# Patient Record
Sex: FEMALE | Race: WHITE | NOT HISPANIC OR LATINO | Employment: FULL TIME | URBAN - METROPOLITAN AREA
[De-identification: names, ages, dates, MRNs, and addresses within clinical notes are randomized per-mention and may not be internally consistent; named-entity substitution may affect disease eponyms.]

---

## 2017-08-16 ENCOUNTER — OFFICE VISIT (OUTPATIENT)
Dept: MEDICAL GROUP | Facility: PHYSICIAN GROUP | Age: 22
End: 2017-08-16
Payer: COMMERCIAL

## 2017-08-16 VITALS
BODY MASS INDEX: 20.72 KG/M2 | HEIGHT: 67 IN | TEMPERATURE: 98.8 F | WEIGHT: 131.99 LBS | DIASTOLIC BLOOD PRESSURE: 60 MMHG | SYSTOLIC BLOOD PRESSURE: 100 MMHG | OXYGEN SATURATION: 98 % | HEART RATE: 76 BPM

## 2017-08-16 DIAGNOSIS — B07.9 VIRAL WARTS, UNSPECIFIED TYPE: ICD-10-CM

## 2017-08-16 DIAGNOSIS — K59.00 CONSTIPATION, UNSPECIFIED CONSTIPATION TYPE: ICD-10-CM

## 2017-08-16 DIAGNOSIS — G43.009 MIGRAINE WITHOUT AURA AND WITHOUT STATUS MIGRAINOSUS, NOT INTRACTABLE: ICD-10-CM

## 2017-08-16 PROBLEM — Z76.89 ENCOUNTER TO ESTABLISH CARE: Status: RESOLVED | Noted: 2017-08-16 | Resolved: 2017-08-16

## 2017-08-16 PROBLEM — Z76.89 ENCOUNTER TO ESTABLISH CARE: Status: ACTIVE | Noted: 2017-08-16

## 2017-08-16 PROCEDURE — 17110 DESTRUCTION B9 LES UP TO 14: CPT | Performed by: PHYSICIAN ASSISTANT

## 2017-08-16 PROCEDURE — 99204 OFFICE O/P NEW MOD 45 MIN: CPT | Mod: 25 | Performed by: PHYSICIAN ASSISTANT

## 2017-08-16 RX ORDER — CYCLOBENZAPRINE HCL 5 MG
5-10 TABLET ORAL
Qty: 30 TAB | Refills: 0 | Status: SHIPPED | OUTPATIENT
Start: 2017-08-16 | End: 2020-05-26

## 2017-08-16 ASSESSMENT — PATIENT HEALTH QUESTIONNAIRE - PHQ9: CLINICAL INTERPRETATION OF PHQ2 SCORE: 0

## 2017-08-16 NOTE — MR AVS SNAPSHOT
"        Mariia Kwonjose alberto   2017 2:00 PM   Office Visit   MRN: [id number]    Department:  [facility]   Dept Phone:  [phone]    Description:  Female : 1995   Provider:  Debbi Bernal PA-C           Reason for Visit     Warts removal, right hand      Allergies as of 2017     No Known Allergies      You were diagnosed with     Encounter to establish care   [089390]       Migraine without aura and without status migrainosus, not intractable   [[id number]]       Constipation, unspecified constipation type   [2739123]       Viral warts, unspecified type   [9107629]         Vital Signs     Blood Pressure Pulse Temperature Height Weight Body Mass Index    100/60 mmHg 76 37.1 °C (98.8 °F) 1.702 m (5' 7.01\") 59.87 kg (131 lb 15.8 oz) 20.67 kg/m2    Oxygen Saturation Smoking Status                98% Never Smoker           Basic Information     Date Of Birth Sex Race Ethnicity Preferred Language    1995 Female White Non- English      Problem List              ICD-10-CM Priority Class Noted - Resolved    Encounter to establish care Z76.89   2017 - Present    Migraine without aura and without status migrainosus, not intractable G43.009   2017 - Present    Constipation K59.00   2017 - Present    Wart viral B07.9   2017 - Present      Health Maintenance        Date Due Completion Dates    IMM HPV VACCINE (3 of 3 - Female 3 Dose Series) 2014, 7/3/2013    PAP SMEAR 2015 ---    IMM INFLUENZA (1) 2017 10/26/2016    IMM DTaP/Tdap/Td Vaccine (7 - Td) 2019, 2000, 1996, 1995, 1995, 2/15/1995            Current Immunizations     DTP/HIB Combined Vaccine 1996, 1995, 1995, 2/15/1995    Dtap Vaccine 2000    HPV Quadrivalent Vaccine (GARDASIL) 2013, 7/3/2013    Hepatitis A Vaccine, Ped/Adol 7/3/2013, 2000    Hepatitis B Vaccine Non-Recombivax (Ped/Adol) 1995, 2/15/1995, 1994    IPV 2000, 1995, " 4/18/1995, 2/15/1995    Influenza TIV (IM) 10/26/2016    MMR Vaccine 6/8/2000, 1/24/1996    Meningococcal Conjugate Vaccine MCV4 (Menactra) 7/3/2013    Tdap Vaccine 8/5/2009    Varicella Vaccine Live 8/5/2009, 10/30/1998      Below and/or attached are the medications your provider expects you to take. Review all of your home medications and newly ordered medications with your provider and/or pharmacist. Follow medication instructions as directed by your provider and/or pharmacist. Please keep your medication list with you and share with your provider. Update the information when medications are discontinued, doses are changed, or new medications (including over-the-counter products) are added; and carry medication information at all times in the event of emergency situations     Allergies:  No Known Allergies          Medications  Valid as of: August 16, 2017 -  2:27 PM    Generic Name Brand Name Tablet Size Instructions for use    Cyclobenzaprine HCl (Tab) FLEXERIL 5 MG Take 1-2 Tabs by mouth 1 time daily as needed.        Levonorgestrel   by Intrauterine route.        .                 Medicines prescribed today were sent to:     [facility] [address] [place] [id number]    Phone: [phone] Fax: [phone]    Open 24 Hours?: No      Medication refill instructions:       If your prescription bottle indicates you have medication refills left, it is not necessary to call your provider’s office. Please contact your pharmacy and they will refill your medication.    If your prescription bottle indicates you do not have any refills left, you may request refills at any time through one of the following ways: The online Featurespace system (except Urgent Care), by calling your provider’s office, or by asking your pharmacy to contact your provider’s office with a refill request. Medication refills are processed only during regular business hours and may not be  available until the next business day. Your provider may request additional information or to have a follow-up visit with you prior to refilling your medication.   *Please Note: Medication refills are assigned a new Rx number when refilled electronically. Your pharmacy may indicate that no refills were authorized even though a new prescription for the same medication is available at the pharmacy. Please request the medicine by name with the pharmacy before contacting your provider for a refill.           ZeusControls Access Code: [email or web address]  Expires: 9/14/2017 12:20 PM    ZeusControls  A secure, online tool to manage your health information     [employer]’s ZeusControls® is a secure, online tool that connects you to your personalized health information from the privacy of your home -- day or night - making it very easy for you to manage your healthcare. Once the activation process is completed, you can even access your medical information using the ZeusControls toby, which is available for free in the Apple Toby store or Google Play store.     ZeusControls provides the following levels of access (as shown below):   My Chart Features   [facility] Doctor [facility]  Specialists [facility]  Urgent  Care Non-[facility]  Primary Care  Doctor   Email your healthcare team securely and privately 24/7 X X X    Manage appointments: schedule your next appointment; view details of past/upcoming appointments X      Request prescription refills. X      View recent personal medical records, including lab and immunizations X X X X   View health record, including health history, allergies, medications X X X X   Read reports about your outpatient visits, procedures, consult and ER notes X X X X   See your discharge summary, which is a recap of your hospital and/or ER visit that includes your diagnosis, lab results, and care plan. X X       How to register for ZeusControls:  1. Go to  [email or web address].  2. Click on the Sign Up Now box, which takes  you to the New Member Sign Up page. You will need to provide the following information:  a. Enter your [employer] Access Code exactly as it appears at the top of this page. (You will not need to use this code after you’ve completed the sign-up process. If you do not sign up before the expiration date, you must request a new code.)   b. Enter your date of birth.   c. Enter your home email address.   d. Click Submit, and follow the next screen’s instructions.  3. Create a [employer] ID. This will be your [employer] login ID and cannot be changed, so think of one that is secure and easy to remember.  4. Create a [employer] password. You can change your password at any time.  5. Enter your Password Reset Question and Answer. This can be used at a later time if you forget your password.   6. Enter your e-mail address. This allows you to receive e-mail notifications when new information is available in [employer].  7. Click Sign Up. You can now view your health information.    For assistance activating your [employer] account, call [phone]

## 2017-08-16 NOTE — Clinical Note
[facility]  Debbi Bernal PA-C  [address]  [place] [address]  Fax: [phone]   Authorization for Release/Disclosure of   Protected Health Information   Name: GIDEON ALEXANDRA : 1995 SSN: [id number]   Address: [place] [address] Phone:    [phone] (home)    I authorize the entity listed below to release/disclose the PHI below to:   [facility]/Debbi Bernal PA-C and Debbi Bernal PA-C   Provider or Entity Name:     Address   City, State, Zip   Phone:      Fax:     Reason for request: continuity of care   Information to be released:    [  ] LAST COLONOSCOPY,  including any PATH REPORT and follow-up  [  ] LAST FIT/COLOGUARD RESULT [  ] LAST DEXA  [  ] LAST MAMMOGRAM  [  ] LAST PAP  [  ] LAST LABS [  ] RETINA EXAM REPORT  [  ] IMMUNIZATION RECORDS  [ x] Release all info      [  ] Check here and initial the line next to each item to release ALL health information INCLUDING  _____ Care and treatment for drug and / or alcohol abuse  _____ HIV testing, infection status, or AIDS  _____ Genetic Testing    DATES OF SERVICE OR TIME PERIOD TO BE DISCLOSED: _____________  I understand and acknowledge that:  * This Authorization may be revoked at any time by you in writing, except if your health information has already been used or disclosed.  * Your health information that will be used or disclosed as a result of you signing this authorization could be re-disclosed by the recipient. If this occurs, your re-disclosed health information may no longer be protected by State or Federal laws.  * You may refuse to sign this Authorization. Your refusal will not affect your ability to obtain treatment.  * This Authorization becomes effective upon signing and will  on (date) __________.      If no date is indicated, this Authorization will  one (1) year from the signature date.    Name: Gideon Alexandra    Signature:   Date:     2017       PLEASE FAX REQUESTED RECORDS BACK TO: [phone]  [phone]

## 2017-08-16 NOTE — PROGRESS NOTES
Chief Complaint   Patient presents with   • Warts     removal, right hand       HISTORY OF THE PRESENT ILLNESS: Mariia Wills is a 21 y.o. female new patient to our practice. This pleasant patient is here today to establish care and to discuss the evaluation and management of:      Constipation  Pt. States she has been experiencing constipation since high school.  States her diet is healthy and consist of fruits, vegetables, chicken, and salads. No mention of improvement on fiber intake. States once a week she'll experience constipation for 1-2 days. States during those times she has tried Miralax but inconsistently and prune juice. Denies nausea, vomiting, abdominal pain, melena, hematochezia, fever, chills, weight loss.    Verrucae vulgaris  Patient states she developed a wart on her right lateral posterior hand getting her first week in [place] 2 months ago. States during hot weather she will fill a throbbing sensation where the wart is located. States she first developed a small, raised, red bump that eventually developed into a wart. Denies trauma/bleeding/inflammation/discharge, fever, chills, nausea, vomiting.    Migraine without aura and without status migrainosus, not intractable  Pt.states she developed migraines when she was a karlos in high school. States she was treated by a primary care provider at [facility] and has tried several prescribed medications with no success ( unable to recall medication). States in the past she has tried eliminating certain foods and trying various birth control methods. States she was placed on oral contraceptive (unable to recall medication name)  when she was a sophomore in high school to treat acne. States freshman year in college and she was placed on NuvaRing in hopes to improve migraine headaches with no improvement. States she was changed to a different type of oral contraceptive (unable to recall medication name) during her sophomore year in college with no  alleviation of migraines. States birth control was discontinued for 2-3 months prior to getting a Mirena IUD 1-1/2 years ago in hopes migraines would dissipate. States she was still experiencing migraines without contraceptive. States with the Mirena IUD she was experiencing 2-3 migraines per month until she started getting monthly massages. States since getting a monthly massages she experiences one migraine per month. Patient states when she develops a migraine she experiences one episode of vomiting lasting a few minutes in duration, is nauseated feeling, not sensitive for light or sounds. States migraine headache is localized to left portion of her head and neck and last 24-48 hours in duration. Patient believes she is experiencing tension-induced migraines. States during migraine episode mother will massage her neck/upper back and she will use essential oils. States she receives some alleviation when doing so. Denies vision changes, weight loss, fever, chills, syncope, dizziness, tinnitus, aura, excessive thirst, cold intolerance, heat intolerance, fatigue.        No past medical history on file.    No past surgical history on file.    Family Status   Relation Status Death Age   • Mother Alive    • Father Alive    • Sister Alive    • Paternal Grandfather Alive    • Maternal Grandmother     • Maternal Grandfather     • Paternal Grandmother       Family History   Problem Relation Age of Onset   • No Known Problems Mother    • No Known Problems Father    • Other Sister      migraine headaches   • Cancer Paternal Grandmother      Breast cancer   • Cancer Maternal Grandmother      Colon cancer   • Heart Disease Paternal Grandfather    • Lung Disease Maternal Grandfather        Social History   Substance Use Topics   • Smoking status: Never Smoker    • Smokeless tobacco: None      Comment: 5 cigarettes entire life   • Alcohol Use: None      Comment: once every two weeks (4-5 glasses of wine  "per time)       Allergies: Review of patient's allergies indicates no known allergies.    Current Outpatient Prescriptions [facility]   Medication Sig Dispense Refill   • Levonorgestrel (MIRENA, 52 MG, IU) by Intrauterine route.     • cyclobenzaprine (FLEXERIL) 5 MG tablet Take 1-2 Tabs by mouth 1 time daily as needed. 30 Tab 0     No current Epic-ordered facility-administered medications on file.       Review of Systems   Constitutional: Negative for fever, chills, weight loss and malaise/fatigue.   HENT: Negative for ear pain, nosebleeds, congestion, sore throat and neck pain.    Eyes: Negative for blurred vision.   Respiratory: Negative for cough, sputum production, shortness of breath and wheezing.    Cardiovascular: Negative for chest pain, palpitations, orthopnea and leg swelling.   Gastrointestinal: Negative for heartburn, nausea, vomiting and abdominal pain. Positive for constipation.  Genitourinary: Negative for dysuria, urgency and frequency.   Musculoskeletal: Negative for myalgias, back pain and joint pain.   Skin: Negative for rash and itching.   Neurological: Negative for dizziness, tingling, tremors, sensory change, focal weakness and positive for headaches.   Endo/Heme/Allergies: Does not bruise/bleed easily.   Psychiatric/Behavioral: Negative for depression, anxiety, or memory loss.     All other systems reviewed and are negative except as in HPI.    Exam: Blood pressure 100/60, pulse 76, temperature 37.1 °C (98.8 °F), height 1.702 m (5' 7.01\"), weight 59.87 kg (131 lb 15.8 oz), SpO2 98 %. Body mass index is 20.67 kg/(m^2).  General: Normal appearing. No distress.  HEENT: Normocephalic. Eyes conjunctiva clear lids without ptosis, pupils equal and reactive to light accommodation, ears normal shape and contour, canals are clear bilaterally, tympanic membranes are benign, nasal mucosa benign, oropharynx is without erythema, edema or exudates.   Neck: Supple without JVD or bruit. Thyroid is not " enlarged.  Pulmonary: Clear to ausculation.  Normal effort. No rales, ronchi, or wheezing.  Cardiovascular: Regular rate and rhythm without murmur. Carotid and radial pulses are intact and equal bilaterally.  Abdomen: Soft, nontender, nondistended. Normal bowel sounds. Liver and spleen are not palpable  Neurologic: Grossly nonfocal  Lymph: No cervical, supraclavicular or axillary lymph nodes are palpable  Skin: Warm and dry.  No obvious lesions. Positive for a pea sized verrucae vulgaris on right medial posterior portion of hand. Negative for erythema/inflammation/discharge/bleeding/trauma.  Musculoskeletal: Normal gait. No extremity cyanosis, clubbing, or edema.  Psych: Normal mood and affect. Alert and oriented x3. Judgment and insight is normal.      Medical decision-making and discussion: She is a 21-year-old female who is here today to establish care and discuss report on right medial posterior portion of hand that developed 2 months ago, constipation, and migraines. Patient states in experiencing constipation since high school and is aware that she needs to improve on fiber intake. During today's office visit advised patient to increase fiber intake, stay hydrated, suggested over-the-counter MiraLAX and probiotics. Disscused with patient MiraLAX needs to be used consistently used for best results and up to 4 cupfuls can be used per day. Advised patient she can adjust frequency and amount of MirLax she uses according to frequency in BMs. Patient states she has suffered from migraines since high school and believes they are tension related. During today's visit patient was prescribed to flexeril 5 mg tablet as needed. Discussed with patient the adverse reactions and side effects of the medication. Discussed with patient to not operate any machinery when taking the medication advised patient to begin taking the medication at night due to sedative effects. Discussed with patient that if she feels that 5 mg is too  potent for her than she can try taking 2.5 mg instead. Advised patient if she develops side effects and complications of medication to make an appointment for further evaluation and discuss other treatment options. During today's visit a common wart was removed from the right medial posterior portion of the patient's hands with cryotherapy. Patient will follow up in one month for reevaluation of common wart and med eval.  CRYOTHERAPY:  Discussed risks and benefits of cryotherapy. Patient verbally agreed. Three applications of liquid nitrogen were applied to 1 lesion on right medial posterior portion of hand. Patient tolerated procedure well. Aftercare and return precaution instructions given.    Please note that this dictation was created using voice recognition software. I have made every reasonable attempt to correct obvious errors, but I expect that there are errors of grammar and possibly content that I did not discover before finalizing the note.      Assessment/Plan  1. Migraine without aura and without status migrainosus, not intractable  cyclobenzaprine (FLEXERIL) 5 MG tablet   2. Constipation, unspecified constipation type     3. Viral warts, unspecified type         Return in about 1 month (around 9/16/2017).

## 2017-08-16 NOTE — ASSESSMENT & PLAN NOTE
Pt.states she developed migraines when she was a karlos in high school. States she was treated by a primary care provider at [facility] and has tried several prescribed medications with no success ( unable to recall medication). States in the past she has tried eliminating certain foods and trying various birth control methods. States she was placed on oral contraceptive (unable to recall medication name)  when she was a sophomore in high school to treat acne. States freshman year in college and she was placed on NuvaRing in hopes to improve migraine headaches with no improvement. States she was changed to a different type of oral contraceptive (unable to recall medication name) during her sophomore year in college with no alleviation of migraines. States birth control was discontinued for 2-3 months prior to getting a Mirena IUD 1-1/2 years ago in hopes migraines would dissipate. States she was still experiencing migraines without contraceptive. States with the Mirena IUD she was experiencing 2-3 migraines per month until she started getting monthly massages. States since getting a monthly massages she experiences one migraine per month. Patient states when she develops a migraine she experiences one episode of vomiting lasting a few minutes in duration, is nauseated feeling, not sensitive for light or sounds. States migraine headache is localized to left portion of her head and neck and last 24-48 hours in duration. Patient believes she is experiencing tension-induced migraines. States during migraine episode mother will massage her neck/upper back and she will use essential oils. States she receives some alleviation when doing so. Denies vision changes, weight loss, fever, chills, syncope, dizziness, tinnitus, aura, excessive thirst, cold intolerance, heat intolerance, fatigue.

## 2017-08-16 NOTE — ASSESSMENT & PLAN NOTE
Patient states she developed a wart on her right medial posterior hand getting her first week in [place] 2 months ago. States during hot weather she will fill a throbbing sensation where the wart is located. States she first developed a small, raised, red bump that eventually developed into a wart. Denies trauma/bleeding/inflammation/discharge, fever, chills, nausea, vomiting.

## 2017-08-16 NOTE — ASSESSMENT & PLAN NOTE
Pt. States she has been experiencing constipation since high school.  States her diet is healthy and consist of fruits, vegetables, chicken, and salads. No mention of improvement on fiber intake. States once a week she'll experience constipation for 1-2 days. States during those times she has tried Miralax but inconsistently and prune juice. Denies nausea, vomiting, abdominal pain, melena, hematochezia, fever, chills, weight loss.

## 2017-10-06 ENCOUNTER — OFFICE VISIT (OUTPATIENT)
Dept: MEDICAL GROUP | Facility: PHYSICIAN GROUP | Age: 22
End: 2017-10-06
Payer: COMMERCIAL

## 2017-10-06 VITALS
OXYGEN SATURATION: 100 % | BODY MASS INDEX: 20.72 KG/M2 | HEART RATE: 72 BPM | WEIGHT: 132 LBS | HEIGHT: 67 IN | TEMPERATURE: 97.2 F | RESPIRATION RATE: 16 BRPM | SYSTOLIC BLOOD PRESSURE: 102 MMHG | DIASTOLIC BLOOD PRESSURE: 68 MMHG

## 2017-10-06 DIAGNOSIS — G43.009 MIGRAINE WITHOUT AURA AND WITHOUT STATUS MIGRAINOSUS, NOT INTRACTABLE: ICD-10-CM

## 2017-10-06 DIAGNOSIS — B07.9 VIRAL WARTS, UNSPECIFIED TYPE: ICD-10-CM

## 2017-10-06 PROCEDURE — 99214 OFFICE O/P EST MOD 30 MIN: CPT | Mod: 29 | Performed by: PHYSICIAN ASSISTANT

## 2017-10-06 ASSESSMENT — PAIN SCALES - GENERAL: PAINLEVEL: NO PAIN

## 2017-10-06 NOTE — ASSESSMENT & PLAN NOTE
Patient states since last office visit on 08/16/17 post cryotherapy wart on her right medial MCP joint of thumb has drastically improved. Denies complications from cryotherapy. States she used duck tape on area nightly for one week but discontinued due to causing area to smell. States she used Compound W bid for one week post duct tape.  Denies trauma/bleeding/inflammation/discharge.

## 2017-10-06 NOTE — ASSESSMENT & PLAN NOTE
Pt. States since last office visit on 08/16/17 she has experienced 3 separate migraines. Patient was prescribed Flexeril 5 mg tablet to take when experiencing a migraine during last office visit. Patient states she's unsure if the medication makes her feel nauseated or if it's due to the migraines. Admits during last migraine episode she did not feel nauseated with medication. States she would like to continue Flexeril for treatment of her migraines. States migraine duration has not improved but intensity of migraine has improved since being on prescribed medication. States in the past she has tried several other treatment options with no success. Is unable to recall medications used in the past. States she is still experiencing one episode of vomiting with migraine headaches. Denies sensitivity to light or sounds.  Denies vision changes, weight loss, fever, chills, syncope, dizziness, tinnitus, aura, excessive thirst, cold intolerance, heat intolerance, fatigue, chest pain, shortness of breath.

## 2017-10-07 NOTE — PROGRESS NOTES
Chief Complaint   Patient presents with   • Follow-Up     Wart check, migranes       HISTORY OF PRESENT ILLNESS: Mariia Wills is an established 21 y.o. female here to discuss the evaluation and management of:    Migraine without aura and without status migrainosus, not intractable  Pt. States since last office visit on 08/16/17 she has experienced 3 separate migraines. Patient was prescribed Flexeril 5 mg tablet to take when experiencing a migraine during last office visit. Patient states she's unsure if the medication makes her feel nauseated or if it's due to the migraines. Admits during last migraine episode she did not feel nauseated with medication. States she would like to continue Flexeril for treatment of her migraines. States migraine duration has not improved but intensity of migraine has improved since being on prescribed medication. States in the past she has tried several other treatment options with no success. Is unable to recall medications used in the past. States she is still experiencing one episode of vomiting with migraine headaches. Denies sensitivity to light or sounds.  Denies vision changes, weight loss, fever, chills, syncope, dizziness, tinnitus, aura, excessive thirst, cold intolerance, heat intolerance, fatigue, chest pain, shortness of breath.    Verrucae vulgaris  Patient states since last office visit on 08/16/17 post cryotherapy wart on her right medial MCP joint of thumb has drastically improved. Denies complications from cryotherapy. States she used duck tape on area nightly for one week but discontinued due to causing area to smell. States she used Compound W bid for one week post duct tape.  Denies trauma/bleeding/inflammation/discharge.         Patient Active Problem List    Diagnosis Date Noted   • Migraine without aura and without status migrainosus, not intractable 08/16/2017   • Constipation 08/16/2017   • Verrucae vulgaris 08/16/2017       Allergies:Review of patient's  allergies indicates no known allergies.    Current Outpatient Prescriptions   Medication Sig Dispense Refill   • Levonorgestrel (MIRENA, 52 MG, IU) by Intrauterine route.     • cyclobenzaprine (FLEXERIL) 5 MG tablet Take 1-2 Tabs by mouth 1 time daily as needed. 30 Tab 0     No current facility-administered medications for this visit.        Social History   Substance Use Topics   • Smoking status: Never Smoker   • Smokeless tobacco: Never Used      Comment: 5 cigarettes entire life   • Alcohol use No      Comment: once every two weeks (4-5 glasses of wine per time)       Family Status   Relation Status   • Mother Alive   • Father Alive   • Sister Alive   • Paternal Grandfather Alive   • Maternal Grandmother    • Maternal Grandfather    • Paternal Grandmother    • Sister      Family History   Problem Relation Age of Onset   • No Known Problems Mother    • No Known Problems Father    • Heart Disease Paternal Grandfather    • Cancer Maternal Grandmother      Colon cancer   • Lung Disease Maternal Grandfather    • Cancer Paternal Grandmother      Breast cancer   • Other Sister      migraine headaches       ROS:  Review of Systems   Constitutional: Negative for fever, chills, weight loss and malaise/fatigue.   HENT: Negative for ear pain, nosebleeds, congestion, sore throat and neck pain.    Eyes: Negative for blurred vision.   Respiratory: Negative for cough, sputum production, shortness of breath and wheezing.    Cardiovascular: Negative for chest pain, palpitations, orthopnea and leg swelling.   Gastrointestinal: Negative for heartburn, nausea, vomiting and abdominal pain.   Genitourinary: Negative for dysuria, urgency and frequency.   Musculoskeletal: Negative for myalgias, back pain and joint pain.   Skin: Negative for rash and itching. Positive for wart on right hand.   Neurological: Negative for dizziness, tingling, tremors, sensory change, focal weakness and positive for headaches.  "  Endo/Heme/Allergies: Does not bruise/bleed easily.   Psychiatric/Behavioral: Negative for depression, suicidal ideas and memory loss.  The patient is not nervous/anxious and does not have insomnia.    All other systems reviewed and are negative except as in HPI.    Exam: Blood pressure 102/68, pulse 72, temperature 36.2 °C (97.2 °F), resp. rate 16, height 1.702 m (5' 7\"), weight 59.9 kg (132 lb), SpO2 100 %, not currently breastfeeding. Body mass index is 20.67 kg/m².  General: Normal appearing. No distress.  HEENT: Normocephalic. Eyes conjunctiva clear lids without ptosis, pupils equal and reactive to light accommodation, ears normal shape and contour, canals are clear bilaterally, tympanic membranes are benign, nasal mucosa benign, oropharynx is without erythema, edema or exudates.   Neck: Supple without JVD or bruit. Thyroid is not enlarged.  Pulmonary: Clear to ausculation.  Normal effort. No rales, ronchi, or wheezing.  Cardiovascular: Regular rate and rhythm without murmur. Carotid and radial pulses are intact and equal bilaterally.  Abdomen: Soft, nontender, nondistended. Normal bowel sounds. Liver and spleen are not palpable  Neurologic: Grossly nonfocal.  Cranial nerves are normal. DTR's normal and symmetric.  Lymph: No cervical, supraclavicular or axillary lymph nodes are palpable  Skin: Warm and dry.  No rashes or suspicious skin lesions. Small slightly raised circular fleshed colored verrucae vulgaris on right medial MCP thumb joint.   Musculoskeletal: Normal gait. No extremity cyanosis, clubbing, or edema.  Psych: Normal mood and affect. Alert and oriented x3. Judgment and insight is normal.    Medical decision-making and discussion:  1. Migraine without aura and without status migrainosus, not intractable  Continue current medication regimen. Discussed with patient the adverse reactions and side effects of the medication. Discussed with patient to not operate any machinery when taking the medication " advised patient to begin taking the medication at night due to sedative effects. Advised patient if she develops side effects and complications of medication to make an appointment for further evaluation and discuss other treatment options    2. Viral warts, unspecified type  Patient declines cryotherapy treatment during today's office visit. Advised patient to continue using Compound W. Advised patient if symptoms do not improve or get worse to make appointment for further evaluation.      Please note that this dictation was created using voice recognition software. I have made every reasonable attempt to correct obvious errors, but I expect that there are errors of grammar and possibly content that I did not discover before finalizing the note.    Assessment/Plan:  1. Migraine without aura and without status migrainosus, not intractable     2. Viral warts, unspecified type         Return in about 6 months (around 4/6/2018).

## 2018-10-04 ENCOUNTER — OFFICE VISIT (OUTPATIENT)
Dept: URGENT CARE | Facility: PHYSICIAN GROUP | Age: 23
End: 2018-10-04
Payer: COMMERCIAL

## 2018-10-04 VITALS
HEART RATE: 65 BPM | SYSTOLIC BLOOD PRESSURE: 110 MMHG | DIASTOLIC BLOOD PRESSURE: 62 MMHG | WEIGHT: 130 LBS | OXYGEN SATURATION: 98 % | TEMPERATURE: 98.1 F | BODY MASS INDEX: 20.4 KG/M2 | HEIGHT: 67 IN | RESPIRATION RATE: 14 BRPM

## 2018-10-04 DIAGNOSIS — B37.31 VAGINAL CANDIDIASIS: ICD-10-CM

## 2018-10-04 PROCEDURE — 99214 OFFICE O/P EST MOD 30 MIN: CPT | Performed by: PHYSICIAN ASSISTANT

## 2018-10-04 RX ORDER — FLUCONAZOLE 150 MG/1
150 TABLET ORAL ONCE
Qty: 1 TAB | Refills: 0 | Status: SHIPPED | OUTPATIENT
Start: 2018-10-04 | End: 2018-10-04

## 2018-10-04 ASSESSMENT — ENCOUNTER SYMPTOMS
CHILLS: 0
DIARRHEA: 0
MUSCULOSKELETAL NEGATIVE: 1
FEVER: 0
DIZZINESS: 0
NAUSEA: 0
VOMITING: 0
ABDOMINAL PAIN: 0
SHORTNESS OF BREATH: 0
FLANK PAIN: 0

## 2018-10-04 NOTE — PROGRESS NOTES
"Subjective:      Mariia Wills is a 22 y.o. female who presents with Vaginal Discharge (itchness x2 days )            Vaginal Itching   This is a new problem. The current episode started in the past 7 days (2 days). The problem occurs constantly. The problem has been unchanged. Pertinent negatives include no abdominal pain, chest pain, chills, congestion, fever, nausea or vomiting. Nothing aggravates the symptoms. She has tried nothing for the symptoms.     Patient presents to urgent care reporting a 2 day history of vaginal itching with thick, white discharge. She has had yeast infections many times in the past and reports her symptoms are the same. She denies malodorous discharge or concern for STD exposure. No fevers, chills, body aches, abdominal pain, nausea, vomiting, or urinary symptoms.     Review of Systems   Constitutional: Negative for chills and fever.   HENT: Negative for congestion.    Respiratory: Negative for shortness of breath.    Cardiovascular: Negative for chest pain.   Gastrointestinal: Negative for abdominal pain, diarrhea, nausea and vomiting.   Genitourinary: Negative for dysuria, flank pain, frequency, hematuria and urgency.        + vaginal itching and discharge   Musculoskeletal: Negative.    Neurological: Negative for dizziness.        Objective:     /62   Pulse 65   Temp 36.7 °C (98.1 °F) (Temporal)   Resp 14   Ht 1.702 m (5' 7\")   Wt 59 kg (130 lb)   SpO2 98%   BMI 20.36 kg/m²      Physical Exam   Constitutional: She is oriented to person, place, and time. She appears well-developed and well-nourished. No distress.   HENT:   Head: Normocephalic and atraumatic.   Eyes: Pupils are equal, round, and reactive to light.   Neck: Normal range of motion.   Cardiovascular: Normal rate.    Pulmonary/Chest: Effort normal.   Genitourinary:   Genitourinary Comments: Exam deferred   Musculoskeletal: Normal range of motion.   Neurological: She is alert and oriented to person, place, and " time.   Skin: Skin is warm and dry. She is not diaphoretic.   Psychiatric: She has a normal mood and affect. Her behavior is normal.   Nursing note and vitals reviewed.         PMH:  has no past medical history on file.  MEDS:   Current Outpatient Prescriptions:   •  fluconazole (DIFLUCAN) 150 MG tablet, Take 1 Tab by mouth Once for 1 dose., Disp: 1 Tab, Rfl: 0  •  Levonorgestrel (MIRENA, 52 MG, IU), by Intrauterine route., Disp: , Rfl:   •  cyclobenzaprine (FLEXERIL) 5 MG tablet, Take 1-2 Tabs by mouth 1 time daily as needed., Disp: 30 Tab, Rfl: 0  ALLERGIES: No Known Allergies  SURGHX: History reviewed. No pertinent surgical history.  SOCHX:  reports that she has never smoked. She has never used smokeless tobacco. She reports that she drinks alcohol. She reports that she uses drugs, including Marijuana.  FH: family history includes Cancer in her maternal grandmother and paternal grandmother; Heart Disease in her paternal grandfather; Lung Disease in her maternal grandfather; No Known Problems in her father and mother; Other in her sister.       Assessment/Plan:     1. Vaginal candidiasis  - fluconazole (DIFLUCAN) 150 MG tablet; Take 1 Tab by mouth Once for 1 dose.  Dispense: 1 Tab; Refill: 0    Encouraged patient to monitor symptoms closely for any new/worsenind symptoms. She has an appointment with her PCP in 3 days and will follow up then. The patient demonstrated a good understanding and agreed with the treatment plan.

## 2018-10-08 ENCOUNTER — OFFICE VISIT (OUTPATIENT)
Dept: MEDICAL GROUP | Facility: PHYSICIAN GROUP | Age: 23
End: 2018-10-08
Payer: COMMERCIAL

## 2018-10-08 VITALS
RESPIRATION RATE: 14 BRPM | TEMPERATURE: 98.4 F | BODY MASS INDEX: 20.56 KG/M2 | HEART RATE: 78 BPM | DIASTOLIC BLOOD PRESSURE: 70 MMHG | WEIGHT: 131 LBS | OXYGEN SATURATION: 99 % | SYSTOLIC BLOOD PRESSURE: 108 MMHG | HEIGHT: 67 IN

## 2018-10-08 DIAGNOSIS — N64.3 GALACTORRHEA: ICD-10-CM

## 2018-10-08 DIAGNOSIS — G43.009 MIGRAINE WITHOUT AURA AND WITHOUT STATUS MIGRAINOSUS, NOT INTRACTABLE: ICD-10-CM

## 2018-10-08 DIAGNOSIS — Z76.89 ENCOUNTER TO ESTABLISH CARE: ICD-10-CM

## 2018-10-08 DIAGNOSIS — Z23 NEED FOR VACCINATION: ICD-10-CM

## 2018-10-08 PROCEDURE — 90651 9VHPV VACCINE 2/3 DOSE IM: CPT | Performed by: PHYSICIAN ASSISTANT

## 2018-10-08 PROCEDURE — 90471 IMMUNIZATION ADMIN: CPT | Performed by: PHYSICIAN ASSISTANT

## 2018-10-08 PROCEDURE — 99214 OFFICE O/P EST MOD 30 MIN: CPT | Mod: 25 | Performed by: PHYSICIAN ASSISTANT

## 2018-10-08 RX ORDER — FROVATRIPTAN SUCCINATE 2.5 MG/1
TABLET, FILM COATED ORAL
Qty: 30 TAB | Refills: 2 | Status: SHIPPED | OUTPATIENT
Start: 2018-10-08 | End: 2023-04-05

## 2018-10-08 ASSESSMENT — PATIENT HEALTH QUESTIONNAIRE - PHQ9: CLINICAL INTERPRETATION OF PHQ2 SCORE: 0

## 2018-10-10 PROBLEM — N64.3 GALACTORRHEA: Status: ACTIVE | Noted: 2018-10-10

## 2018-10-11 PROBLEM — B07.9 VERRUCAE VULGARIS: Status: RESOLVED | Noted: 2017-08-16 | Resolved: 2018-10-11

## 2018-10-11 PROBLEM — K59.00 CONSTIPATION: Status: RESOLVED | Noted: 2017-08-16 | Resolved: 2018-10-11

## 2018-10-12 NOTE — PROGRESS NOTES
Chief Complaint   Patient presents with   • Migraine     Pt would like to discuss issue   • Other     Pt has had swollen breast and discharge out of LT nipple x 2 wk       HISTORY OF PRESENT ILLNESS: Mariia Wills is an established 22 y.o. female here to discuss the evaluation and management of:    Patient is a 21-year-old female here today to discuss migraines and new onset of galactorrhea.  Patient has a positive medical history for migraines.   Patient was prescribed Flexeril for tension type headaches.  She tells me that she rarely takes medication because it makes her feel tired.  States average she is experiencing 2-3 migraines per month and symptoms can last up to 2 days in duration. States migraine symptoms are associated with menstrual cycles.  She tells me when she develops a migraine she can experience nausea, vomiting, photosensitivity, and sound sensitivity.  Patient currently has a Mirena IUD and it was placed 3 years ago.  Patient is inquiring about treatment options for migraine symptoms.  She tells me that she has been self-medicating with marijuana.      Denies red flag symptoms, including: headache in same location, persistent headache, headache worse with bending over or sneezing, headache waking up during sleep. Also denies difficulty with speech, focal weakness, fever, cough, sinus congestion or teeth grinding.    Family Hx: Mother  Prior imaging: None    She also mentions during today's appointment she has been experiencing left nipple discharge for 3 weeks.  Symptoms have resolved.  He tells me that nipple discharge was clear in color.  States nipple would involuntary leak and discharge excretes when palpated.  States she is also experiencing breast engorgement, breast tenderness with palpation, and nipple is activity.  She denies being pregnant.         Patient Active Problem List    Diagnosis Date Noted   • Galactorrhea 10/10/2018   • Migraine without aura and without status migrainosus, not  intractable 2017   • Constipation 2017   • Verrucae vulgaris 2017       Allergies:Patient has no known allergies.    Current Outpatient Prescriptions   Medication Sig Dispense Refill   • Frovatriptan Succinate 2.5 MG Tab Take 2.5 mg by mouth at onset of symptoms. May repeat dose x 1 after 2h. Do not exceed more than 7.5 mg in 24 hours. 30 Tab 2   • Levonorgestrel (MIRENA, 52 MG, IU) by Intrauterine route.     • cyclobenzaprine (FLEXERIL) 5 MG tablet Take 1-2 Tabs by mouth 1 time daily as needed. 30 Tab 0     No current facility-administered medications for this visit.        Social History   Substance Use Topics   • Smoking status: Never Smoker   • Smokeless tobacco: Never Used      Comment: 5 cigarettes entire life   • Alcohol use 0.0 oz/week      Comment: once every two weeks (4-5 glasses of wine per time)       Family Status   Relation Status   • Mo Alive   • Fa Alive   • Sis Alive   • PGFa Alive   • MGMo    • MGFa    • PGMo    • Sis (Not Specified)     Family History   Problem Relation Age of Onset   • No Known Problems Mother    • No Known Problems Father    • Heart Disease Paternal Grandfather    • Cancer Maternal Grandmother         Colon cancer   • Lung Disease Maternal Grandfather    • Cancer Paternal Grandmother         Breast cancer   • Other Sister         migraine headaches       ROS:  Review of Systems   Constitutional: Negative for fever, chills, weight loss and malaise/fatigue.   HENT: Negative for ear pain, nosebleeds, congestion, sore throat and neck pain.    Eyes: Negative for blurred vision.   Respiratory: Negative for cough, sputum production, shortness of breath and wheezing.    Cardiovascular: Negative for chest pain, palpitations, orthopnea and leg swelling.   Gastrointestinal: Negative for heartburn, nausea, vomiting and abdominal pain.   Genitourinary: Negative for dysuria, urgency and frequency.   Musculoskeletal: Negative for myalgias, back pain  "and joint pain.   Skin: Negative for rash and itching.   Neurological: Negative for dizziness, tingling, tremors, sensory change, focal weakness.  Positive for headaches.   Endo/Heme/Allergies: Does not bruise/bleed easily.   Psychiatric/Behavioral: Negative for depression, suicidal ideas and memory loss.  The patient is not nervous/anxious and does not have insomnia.    All other systems reviewed and are negative except as in HPI.  Breast:  Bilaterally symmetrical. Both breasts are free of palpable pathology and the axillas are free of lymphadenopathy.  Positive for galactorrhea and left breast.      Exam: Blood pressure 108/70, pulse 78, temperature 36.9 °C (98.4 °F), resp. rate 14, height 1.702 m (5' 7\"), weight 59.4 kg (131 lb), SpO2 99 %, not currently breastfeeding. Body mass index is 20.52 kg/m².  General: Normal appearing. No distress.  HEENT: Normocephalic. Eyes conjunctiva clear lids without ptosis, pupils equal and reactive to light accommodation, ears normal shape and contour, canals are clear bilaterally, tympanic membranes are benign, nasal mucosa benign, oropharynx is without erythema, edema or exudates.   Neck: Supple without JVD or bruit. Thyroid is not enlarged.  Pulmonary: Clear to ausculation.  Normal effort. No rales, ronchi, or wheezing.  Cardiovascular: Regular rate and rhythm without murmur.   Abdomen: Soft, nontender, nondistended. Normal bowel sounds. Liver and spleen are not palpable  Neurologic: Grossly nonfocal.  Cranial nerves are normal.   Lymph: No cervical, supraclavicular or axillary lymph nodes are palpable  Skin: Warm and dry.  No rashes or suspicious skin lesions.  Musculoskeletal: Normal gait. No extremity cyanosis, clubbing, or edema.  Psych: Normal mood and affect. Alert and oriented x3. Judgment and insight is normal.  Breast:  Bilaterally symmetrical, no nipple discharge, no skin changes or dimpling are  noted.  Both breasts are free of palpable pathology and the axillas are " free of lymphadenopathy.      Medical decision-making and discussion:  1. Galactorrhea  Pituitary adenoma?    Lab work has been ordered for patient complete.  Patient will be contacted with results.  Depending on results imaging may be warranted at that time.    - TSH WITH REFLEX TO FT4; Future  - PROLACTIN; Future    2. Migraine without aura and without status migrainosus, not intractable  Patient has been prescribed Frovatriptan Succinate 2.5 MG Tab and advised to take 1 tablet by mouth at onset of symptoms.  Advised patient she may repeat dose times 2:01 hours.  Advised patient to not exceed more than 7.5 mg in a 24-hour span.    Also discussed headache prophylaxis regimen.  Provided patient with written directions.    Menstrual migraine headache prophylaxis:  2.5 mg by mouth every day for 6 days.  Advised patient start 2 days prior to onset of menses.    Advised patient to avoid headache triggers.  Emphasized importance of healthy diet, regular exercise routine and practicing good sleep hygiene.  Patient has been referred to neurology to the headache clinic for further evaluation.    - REFERRAL TO NEUROLOGY  - Frovatriptan Succinate 2.5 MG Tab; Take 2.5 mg by mouth at onset of symptoms. May repeat dose x 1 after 2h. Do not exceed more than 7.5 mg in 24 hours.  Dispense: 30 Tab; Refill: 2    3. Encounter to establish care  Patient has been referred to gynecology for further evaluation.  - REFERRAL TO GYNECOLOGY    4. Need for vaccination  HPV vaccination was administered to patient without complication. A VIS form was provided to patient.     - 9VHPV Vaccine 2-3 Dose IM      Please note that this dictation was created using voice recognition software. I have made every reasonable attempt to correct obvious errors, but I expect that there are errors of grammar and possibly content that I did not discover before finalizing the note.      Return if symptoms worsen or fail to improve.

## 2018-10-17 ENCOUNTER — TELEPHONE (OUTPATIENT)
Dept: MEDICAL GROUP | Facility: PHYSICIAN GROUP | Age: 23
End: 2018-10-17

## 2018-10-17 NOTE — TELEPHONE ENCOUNTER
----- Message from Tony Boogie M.D. sent at 10/17/2018  8:36 AM PDT -----  Please let pt knows that hormonal level is normal (thyroid test, and prolactin which is hormone produced from pituitary gland on the brain). If her headaches are persistent worse, please advise pt to follow up with Deb[facility]   Thank you,  Tony Boogie M.D.

## 2018-12-16 ENCOUNTER — HOSPITAL ENCOUNTER (EMERGENCY)
Facility: MEDICAL CENTER | Age: 23
End: 2018-12-16
Attending: EMERGENCY MEDICINE
Payer: COMMERCIAL

## 2018-12-16 ENCOUNTER — APPOINTMENT (OUTPATIENT)
Dept: RADIOLOGY | Facility: MEDICAL CENTER | Age: 23
End: 2018-12-16
Attending: EMERGENCY MEDICINE
Payer: COMMERCIAL

## 2018-12-16 VITALS
WEIGHT: 135 LBS | RESPIRATION RATE: 16 BRPM | SYSTOLIC BLOOD PRESSURE: 123 MMHG | TEMPERATURE: 97.6 F | BODY MASS INDEX: 21.19 KG/M2 | HEIGHT: 67 IN | HEART RATE: 75 BPM | OXYGEN SATURATION: 100 % | DIASTOLIC BLOOD PRESSURE: 74 MMHG

## 2018-12-16 DIAGNOSIS — S93.401A SPRAIN OF RIGHT ANKLE, UNSPECIFIED LIGAMENT, INITIAL ENCOUNTER: ICD-10-CM

## 2018-12-16 PROCEDURE — 73610 X-RAY EXAM OF ANKLE: CPT | Mod: RT

## 2018-12-16 PROCEDURE — 99284 EMERGENCY DEPT VISIT MOD MDM: CPT

## 2018-12-17 NOTE — ED PROVIDER NOTES
ED Provider Note    CHIEF COMPLAINT   Chief Complaint   Patient presents with   • Ankle Injury     R ankle pain after rolled while stepping off curb       HPI   Mariia Wills is a 23 y.o. female who presents after injuring her right ankle today.  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  As she stepped down from a curb, she rolled her right ankle.  She has been having pain, swelling, and difficulty walking since then.  She says she felt something pop in her ankle.  She denies any numbness or tingling to the foot or toes.  She denies any pain to her knee or hip.  She denies any other injuries.    REVIEW OF SYSTEMS   See HPI for further details.      PAST MEDICAL HISTORY   History reviewed. No pertinent past medical history.    FAMILY HISTORY  Family History   Problem Relation Age of Onset   • No Known Problems Mother    • No Known Problems Father    • Heart Disease Paternal Grandfather    • Cancer Maternal Grandmother         Colon cancer   • Lung Disease Maternal Grandfather    • Cancer Paternal Grandmother         Breast cancer   • Other Sister         migraine headaches       SOCIAL HISTORY  Social History     Social History   • Marital status: Single     Spouse name: N/A   • Number of children: N/A   • Years of education: N/A     Social History Main Topics   • Smoking status: Never Smoker   • Smokeless tobacco: Never Used      Comment: 5 cigarettes entire life   • Alcohol use 0.0 oz/week      Comment: once every two weeks (4-5 glasses of wine per time)   • Drug use: Yes     Types: Marijuana   • Sexual activity: Yes     Partners: Male     Birth control/ protection: IUD     Other Topics Concern   • Not on file     Social History Narrative   • No narrative on file       SURGICAL HISTORY  History reviewed. No pertinent surgical history.    CURRENT MEDICATIONS   Home Medications    **Home medications have not yet been  "reviewed for this encounter**         ALLERGIES   No Known Allergies    PHYSICAL EXAM  VITAL SIGNS: /80   Pulse 74   Temp 36.4 °C (97.6 °F) (Oral)   Resp 16   Ht 1.702 m (5' 7\")   Wt 61.2 kg (135 lb)   SpO2 100%   BMI 21.14 kg/m²   Constitutional: Well developed, Well nourished, No acute distress, Non-toxic appearance.   Extremities: Intact peripheral pulses, no edema.  Exam is focused to the right lower extremity.  There is swelling and tenderness over the anterior portion of the lateral malleolus.  There is no tenderness to the medial malleolus or to the dorsum of the ankle.  There is no tenderness along the base of the fifth metatarsal.  There is no tenderness along the dorsum of the foot, calcaneus, or hindfoot.  The Achilles tendon appears to be intact nontender.  There is a good dorsalis pedis pulse, good sensation to the toes, good cap refill.         RADIOLOGY/PROCEDURES  DX-ANKLE 3+ VIEWS RIGHT   Final Result      Ankle soft tissue swelling. No acute osseous abnormality.          COURSE & MEDICAL DECISION MAKING  Pertinent Labs & Imaging studies reviewed. (See chart for details)  The patient presents with the above complaints.  X-rays were obtained which shows no fracture.  The patient will be placed in a orthopedic walking boot, crutches for ambulation.  She is to ice, elevate, weight-bear as tolerated.  She is to avoid dancing until cleared by orthopedics.  She is referred to Dr. Blood of the renal orthopedic clinic as to call the office tomorrow morning.  She is to return to the ER for any worsening pain, redness, swelling, discoloration, or any other problems.    FINAL IMPRESSION  1.  Right ankle injury  2.   3.      Electronically signed by: Itz Sequeira, 12/16/2018 7:28 PM    "

## 2018-12-17 NOTE — ED TRIAGE NOTES
Chief Complaint   Patient presents with   • Ankle Injury     R ankle pain after rolled while stepping off curb

## 2019-01-21 ENCOUNTER — TELEPHONE (OUTPATIENT)
Dept: MEDICAL GROUP | Facility: PHYSICIAN GROUP | Age: 24
End: 2019-01-21

## 2019-01-22 ENCOUNTER — OFFICE VISIT (OUTPATIENT)
Dept: MEDICAL GROUP | Facility: PHYSICIAN GROUP | Age: 24
End: 2019-01-22
Payer: COMMERCIAL

## 2019-01-22 VITALS
HEIGHT: 67 IN | RESPIRATION RATE: 14 BRPM | BODY MASS INDEX: 20.56 KG/M2 | DIASTOLIC BLOOD PRESSURE: 70 MMHG | HEART RATE: 83 BPM | WEIGHT: 131 LBS | OXYGEN SATURATION: 95 % | SYSTOLIC BLOOD PRESSURE: 98 MMHG | TEMPERATURE: 98.1 F

## 2019-01-22 DIAGNOSIS — R10.9 STOMACH PAIN: ICD-10-CM

## 2019-01-22 DIAGNOSIS — G43.009 MIGRAINE WITHOUT AURA AND WITHOUT STATUS MIGRAINOSUS, NOT INTRACTABLE: ICD-10-CM

## 2019-01-22 PROCEDURE — 99213 OFFICE O/P EST LOW 20 MIN: CPT | Performed by: PHYSICIAN ASSISTANT

## 2019-01-22 ASSESSMENT — PATIENT HEALTH QUESTIONNAIRE - PHQ9: CLINICAL INTERPRETATION OF PHQ2 SCORE: 0

## 2019-01-22 NOTE — PROGRESS NOTES
Chief Complaint   Patient presents with   • GI Problem     pt states stomach issue is triggered by ETHOL, dairy, red meat. Pts mother is concerned about ulcer       HISTORY OF PRESENT ILLNESS: Mariia Wills is an established 23 y.o. female here to discuss the evaluation and management of:    Patient is accompanied by her mother.    Patient is a pleasant 23-year-old female here today to discuss stomach pain associated with alcohol use.  States she also experiences discomfort when eating heavy dairy products and larger quantities of red meat.   Patient tells me when she drinks certain beers or liquor she will experience abdominal pain that can last up to several days. She tells me that she also experiences heartburn during those times and may experience intermittent episodes of vomiting as well as fatigue.  States symptoms are only present after drinking alcohol.  She denies chronic use of NSAIDs.  Denies fever, chills, unintentional weight loss, night sweats, mucus with bowel movements, melena, hematochezia.  His abdominal pain, nausea, vomiting or fatigue.       During our last appointment on 10/8/2018 patient was prescribed Frovatriptan Succinate 2.5 MG Tab and advised to take 1 tablet by mouth for headache symptoms.  States since initiating medication headache frequency has drastically improved.  States she has experienced 3 headaches since our last appointment.  Patient would like to continue current medication regimen.       Patient Active Problem List    Diagnosis Date Noted   • Galactorrhea 10/10/2018   • Migraine without aura and without status migrainosus, not intractable 08/16/2017       Allergies:Patient has no known allergies.    Current Outpatient Prescriptions   Medication Sig Dispense Refill   • Frovatriptan Succinate 2.5 MG Tab Take 2.5 mg by mouth at onset of symptoms. May repeat dose x 1 after 2h. Do not exceed more than 7.5 mg in 24 hours. 30 Tab 2   • Levonorgestrel (MIRENA, 52 MG, IU) by  Intrauterine route.     • cyclobenzaprine (FLEXERIL) 5 MG tablet Take 1-2 Tabs by mouth 1 time daily as needed. 30 Tab 0     No current facility-administered medications for this visit.        Social History   Substance Use Topics   • Smoking status: Never Smoker   • Smokeless tobacco: Never Used      Comment: 5 cigarettes entire life   • Alcohol use 0.0 oz/week      Comment: once every two weeks (4-5 glasses of wine per time)       Family Status   Relation Status   • Mo Alive   • Fa Alive   • Sis Alive   • PGFa Alive   • MGMo    • MGFa    • PGMo    • Sis (Not Specified)     Family History   Problem Relation Age of Onset   • No Known Problems Mother    • No Known Problems Father    • Heart Disease Paternal Grandfather    • Cancer Maternal Grandmother         Colon cancer   • Lung Disease Maternal Grandfather    • Cancer Paternal Grandmother         Breast cancer   • Other Sister         migraine headaches       ROS:  Review of Systems   Constitutional: Negative for fever, chills, weight loss and malaise/fatigue.   HENT: Negative for ear pain, nosebleeds, congestion, sore throat and neck pain.    Eyes: Negative for blurred vision.   Respiratory: Negative for cough, sputum production, shortness of breath and wheezing.    Cardiovascular: Negative for chest pain, palpitations, orthopnea and leg swelling.   Gastrointestinal: Negative for heartburn, nausea, vomiting and abdominal pain.   Genitourinary: Negative for dysuria, urgency and frequency.   Musculoskeletal: Negative for myalgias, back pain and joint pain.   Skin: Negative for rash and itching.   Neurological: Negative for dizziness, tingling, tremors, sensory change, focal weakness and headaches.   Endo/Heme/Allergies: Does not bruise/bleed easily.   Psychiatric/Behavioral: Negative for depression, suicidal ideas and memory loss.  The patient is not nervous/anxious and does not have insomnia.    All other systems reviewed and are negative  "except as in HPI.    Exam: Blood pressure (!) 98/70, pulse 83, temperature 36.7 °C (98.1 °F), resp. rate 14, height 1.702 m (5' 7\"), weight 59.4 kg (131 lb), SpO2 95 %, not currently breastfeeding. Body mass index is 20.52 kg/m².  General: Normal appearing. No distress.  HEENT: Normocephalic. Eyes conjunctiva clear lids without ptosis, pupils equal and reactive to light accommodation, ears normal shape and contour, canals are clear bilaterally, tympanic membranes are benign, nasal mucosa benign, oropharynx is without erythema, edema or exudates.   Neck: Supple without JVD or bruit. Thyroid is not enlarged.  Pulmonary: Clear to ausculation.  Normal effort. No rales, ronchi, or wheezing.  Cardiovascular: Regular rate and rhythm without murmur.   Abdomen: Soft, nontender, nondistended. Normal bowel sounds. Liver and spleen are not palpable  Neurologic: Grossly nonfocal.  Cranial nerves are normal.   Lymph: No cervical, supraclavicular or axillary lymph nodes are palpable  Skin: Warm and dry.  No rashes or suspicious skin lesions.  Musculoskeletal: Normal gait. No extremity cyanosis, clubbing, or edema.  Psych: Normal mood and affect. Alert and oriented x3. Judgment and insight is normal.    Medical decision-making and discussion:  1. Stomach pain  Reactive gastropathy?  Food allergy?    Advised patient to keep a food diary.  Avoid foods that causes unpleasant symptoms.  Eat smaller portions.  Suggested taking over-the-counter probiotics as needed.  Continue work on diet, exercise, sleep hygiene, and hydration.   No further workup indicated at this time.    Follow-up for worsening symptoms,lack of expected recovery, or should new symptoms or problems arise.      2. Migraine without aura and without status migrainosus, not intractable  Chronic with stable problem.  Continue current medication regimen.  Continue work on diet, exercise, sleep hygiene, and hydration.  Avoid headache triggers.       Please note that this " dictation was created using voice recognition software. I have made every reasonable attempt to correct obvious errors, but I expect that there are errors of grammar and possibly content that I did not discover before finalizing the note.        Return if symptoms worsen or fail to improve.

## 2019-01-22 NOTE — TELEPHONE ENCOUNTER
MEDICATION PRIOR AUTHORIZATION NEEDED:    1. Name of Medication: Frovatriptan Succinate    2. Requested By (Name of Pharmacy): [facility]     3. Is insurance on file current? yes    4. What is the name & phone number of the 3rd party payor? [employer]

## 2019-06-10 ENCOUNTER — OFFICE VISIT (OUTPATIENT)
Dept: URGENT CARE | Facility: CLINIC | Age: 24
End: 2019-06-10
Payer: COMMERCIAL

## 2019-06-10 ENCOUNTER — TELEPHONE (OUTPATIENT)
Dept: URGENT CARE | Facility: CLINIC | Age: 24
End: 2019-06-10

## 2019-06-10 VITALS
BODY MASS INDEX: 20.88 KG/M2 | HEIGHT: 67 IN | DIASTOLIC BLOOD PRESSURE: 70 MMHG | RESPIRATION RATE: 12 BRPM | OXYGEN SATURATION: 99 % | WEIGHT: 133 LBS | TEMPERATURE: 98.6 F | HEART RATE: 74 BPM | SYSTOLIC BLOOD PRESSURE: 110 MMHG

## 2019-06-10 DIAGNOSIS — Z86.19 HISTORY OF CANDIDIASIS: ICD-10-CM

## 2019-06-10 DIAGNOSIS — N89.8 VAGINAL DISCHARGE: ICD-10-CM

## 2019-06-10 DIAGNOSIS — N89.8 VAGINAL ITCHING: Primary | ICD-10-CM

## 2019-06-10 PROCEDURE — 99214 OFFICE O/P EST MOD 30 MIN: CPT | Performed by: NURSE PRACTITIONER

## 2019-06-10 RX ORDER — LORATADINE 10 MG/1
10 TABLET ORAL DAILY
COMMUNITY

## 2019-06-10 RX ORDER — FLUCONAZOLE 150 MG/1
150 TABLET ORAL DAILY
Qty: 2 TAB | Refills: 0 | Status: SHIPPED | OUTPATIENT
Start: 2019-06-10 | End: 2019-06-11

## 2019-06-10 ASSESSMENT — LIFESTYLE VARIABLES: SUBSTANCE_ABUSE: 0

## 2019-06-10 ASSESSMENT — ENCOUNTER SYMPTOMS
VOMITING: 0
HEADACHES: 0
ABDOMINAL PAIN: 0
NAUSEA: 0

## 2019-06-10 NOTE — PROGRESS NOTES
"Subjective:      Mariia Wills is a 23 y.o. female who presents with Yeast Infection (Onset last week, history of getting yeast infection before or after having period. Area sensitive touch and irritated. Skin is raw and cracking.)    Reviewed past medical, surgical and family history with patient. Reviewed prescription and OTC medications with patient in electronic health record today.     No Known Allergies      BIB friend.     HPI this is a new problem.  Marlene is a 23-year-old female who presents with a yeast infection.  She has positive vaginal itching with thick white discharge.  Her labia are slightly inflamed and cracking.  She states she typically gets this when she has these infections.  She has a yeast infection every 1 to 2 months.  She has an appointment scheduled with a new GYN.  She does not eat a lot of sugary products.  She wears all cotton underclothing.  She has been unable to tolerate over-the-counter Monistat in the past.  Diflucan tablets work well for her.  No other aggravating or alleviating factors. Sexually active in monogomous relationship.  Her last yeast infection was approx 2 months ago.     Review of Systems   Gastrointestinal: Negative for abdominal pain, nausea and vomiting.   Genitourinary: Negative for dysuria, frequency and urgency.   Neurological: Negative for headaches.   Psychiatric/Behavioral: Negative for substance abuse.          Objective:     /70 (BP Location: Left arm, Patient Position: Sitting, BP Cuff Size: Adult)   Pulse 74   Temp 37 °C (98.6 °F) (Temporal)   Resp 12   Ht 1.702 m (5' 7\")   Wt 60.3 kg (133 lb)   SpO2 99%   BMI 20.83 kg/m²      Physical Exam   Constitutional: She is oriented to person, place, and time. She appears well-developed and well-nourished.   Cardiovascular: Normal rate.    Pulmonary/Chest: Effort normal.   Genitourinary:   Genitourinary Comments: Declines GYN exam today    Neurological: She is alert and oriented to person, place, and " time.   Psychiatric: She has a normal mood and affect. Her behavior is normal. Thought content normal.   Nursing note and vitals reviewed.              Assessment/Plan:     1. Vaginal itching  fluconazole (DIFLUCAN) 150 MG tablet   2. History of candidiasis     3. Vaginal discharge       Educated in proper administration of medication(s) ordered today including safety, possible SE, risks, benefits, rationale and alternatives to therapy.     Keep FU appt with GYN for routine care.     Educated in risk reduction for VVC. Verbalizes understanding.

## 2019-06-10 NOTE — TELEPHONE ENCOUNTER
I called in the RX as per the provider, Brenda Orantes to [facility] in [place]. The patient was notified of this and will  the medication today.    Pharmacy:  [facility] [address] [place] [address] - [address] [place] [address] [phone] (Phone)  [phone] (Fax)     Medication:  fluconazole (DIFLUCAN) 150 MG tablet 2 Tab 0/0 6/10/2019 6/11/2019    Sig - Route: Take 1 Tab by mouth every day for 1 day. Repeat in 1 week if symptoms persist. - Oral    Class: Print Rx Paper      Alice Rangel

## 2019-08-05 ENCOUNTER — OFFICE VISIT (OUTPATIENT)
Dept: URGENT CARE | Facility: CLINIC | Age: 24
End: 2019-08-05
Payer: COMMERCIAL

## 2019-08-05 ENCOUNTER — HOSPITAL ENCOUNTER (OUTPATIENT)
Dept: RADIOLOGY | Facility: MEDICAL CENTER | Age: 24
End: 2019-08-05
Attending: PHYSICIAN ASSISTANT
Payer: COMMERCIAL

## 2019-08-05 ENCOUNTER — HOSPITAL ENCOUNTER (OUTPATIENT)
Facility: MEDICAL CENTER | Age: 24
End: 2019-08-05
Attending: PHYSICIAN ASSISTANT
Payer: COMMERCIAL

## 2019-08-05 VITALS
HEIGHT: 67 IN | TEMPERATURE: 98.6 F | SYSTOLIC BLOOD PRESSURE: 104 MMHG | DIASTOLIC BLOOD PRESSURE: 72 MMHG | HEART RATE: 72 BPM | OXYGEN SATURATION: 98 % | WEIGHT: 133 LBS | RESPIRATION RATE: 16 BRPM | BODY MASS INDEX: 20.88 KG/M2

## 2019-08-05 DIAGNOSIS — R10.2 SUPRAPUBIC PAIN: ICD-10-CM

## 2019-08-05 DIAGNOSIS — R10.32 LEFT LOWER QUADRANT PAIN: ICD-10-CM

## 2019-08-05 PROCEDURE — 99214 OFFICE O/P EST MOD 30 MIN: CPT | Performed by: PHYSICIAN ASSISTANT

## 2019-08-05 PROCEDURE — 87086 URINE CULTURE/COLONY COUNT: CPT

## 2019-08-05 PROCEDURE — 76830 TRANSVAGINAL US NON-OB: CPT

## 2019-08-06 ENCOUNTER — TELEPHONE (OUTPATIENT)
Dept: URGENT CARE | Facility: PHYSICIAN GROUP | Age: 24
End: 2019-08-06

## 2019-08-06 DIAGNOSIS — R10.2 SUPRAPUBIC PAIN: ICD-10-CM

## 2019-08-06 DIAGNOSIS — R10.32 LEFT LOWER QUADRANT PAIN: ICD-10-CM

## 2019-08-06 NOTE — TELEPHONE ENCOUNTER
Called and spoke with patient regarding US results- she is to use NSAIDs, heating pad, and follow up with her OB/GYN for recheck.   Pt. Agrees and understands the plan.

## 2019-08-07 ASSESSMENT — ENCOUNTER SYMPTOMS
MYALGIAS: 0
NECK PAIN: 0
ABDOMINAL PAIN: 1
DIARRHEA: 0
BACK PAIN: 0
CHILLS: 0
VOMITING: 0
CONSTIPATION: 0
FEVER: 0
FLANK PAIN: 0
NAUSEA: 0

## 2019-08-07 NOTE — PROGRESS NOTES
Subjective:      Mariia Wills is a 23 y.o. female who presents with Abdominal Pain (left side abdominal pain x 3 days , putting pressure on it can help , )            Patient is a 23-year-old female who presents to urgent care with concern regarding left lower abdominal pain for the last 3 days.  Patient admits it feels like a sharp menstrual cramp of which she has had intermittently over the last several months.  She became concerned as the pain has now persisted for the last 2 to 3 days.  She does report that she has irregular menstrual cycle as she currently has an IUD that was placed 4 years ago.  Patient does have upcoming appointment with OB/GYN in approximately 1 month as she will be due to have her IUD replaced soon.  She denies any urinary symptoms, dysuria, vaginal discharge she is also had history of yeast infections in the recent past.   She denies any vaginal itchiness, or recent dyspareunia.     Abdominal Pain   This is a new problem. Episode onset: 3 days ago. The onset quality is gradual. The problem occurs constantly. The pain is located in the LLQ. The pain is at a severity of 5/10. The pain is moderate. The quality of the pain is aching and cramping. The abdominal pain radiates to the LLQ and suprapubic region. Pertinent negatives include no constipation, diarrhea, dysuria, fever, frequency, hematuria, myalgias, nausea or vomiting. Nothing aggravates the pain. The pain is relieved by certain positions (Pressure over the area of discomfort. ). She has tried nothing for the symptoms. There is no history of abdominal surgery.       Review of Systems   Constitutional: Negative for chills and fever.   Gastrointestinal: Positive for abdominal pain. Negative for constipation, diarrhea, nausea and vomiting.   Genitourinary: Negative for dysuria, flank pain, frequency, hematuria and urgency.   Musculoskeletal: Negative for back pain, myalgias and neck pain.   All other systems reviewed and are  "negative.         Objective:     /72 (BP Location: Left arm, Patient Position: Sitting, BP Cuff Size: Adult)   Pulse 72   Temp 37 °C (98.6 °F) (Temporal)   Resp 16   Ht 1.702 m (5' 7\")   Wt 60.3 kg (133 lb)   SpO2 98%   BMI 20.83 kg/m²    PMH:  has no past medical history on file.  MEDS:   Current Outpatient Medications:   •  Probiotic Product (PROBIOTIC PO), Take 1 Each by mouth every day., Disp: , Rfl:   •  Cholecalciferol (VITAMIN D PO), Take 1 Each by mouth every day., Disp: , Rfl:   •  loratadine (CLARITIN) 10 MG Tab, Take 10 mg by mouth every day., Disp: , Rfl:   •  Omega-3 Fatty Acids (FISH OIL PO), Take 1 Each by mouth every day., Disp: , Rfl:   •  Frovatriptan Succinate 2.5 MG Tab, Take 2.5 mg by mouth at onset of symptoms. May repeat dose x 1 after 2h. Do not exceed more than 7.5 mg in 24 hours., Disp: 30 Tab, Rfl: 2  •  Levonorgestrel (MIRENA, 52 MG, IU), by Intrauterine route., Disp: , Rfl:   •  cyclobenzaprine (FLEXERIL) 5 MG tablet, Take 1-2 Tabs by mouth 1 time daily as needed. (Patient not taking: Reported on 6/10/2019), Disp: 30 Tab, Rfl: 0  ALLERGIES: No Known Allergies  SURGHX: No past surgical history on file.  SOCHX:  reports that she has never smoked. She has never used smokeless tobacco. She reports that she drinks alcohol. She reports that she has current or past drug history. Drug: Marijuana.  FH: Family history was reviewed, no pertinent findings to report      Physical Exam   Constitutional: She is oriented to person, place, and time. She appears well-developed and well-nourished. No distress.   HENT:   Head: Normocephalic and atraumatic.   Eyes: Pupils are equal, round, and reactive to light. Conjunctivae and EOM are normal.   Neck: Normal range of motion. Neck supple. No tracheal deviation present.   Cardiovascular: Normal rate.   Pulmonary/Chest: Effort normal. No respiratory distress.   Abdominal: Soft. There is tenderness in the suprapubic area and left lower quadrant. " There is rigidity. There is no guarding, no tenderness at McBurney's point and negative Mcgraw's sign.       Neurological: She is alert and oriented to person, place, and time. Coordination normal.   Skin: Skin is warm. No rash noted.   Psychiatric: She has a normal mood and affect. Her behavior is normal. Judgment and thought content normal.   Vitals reviewed.              Assessment/Plan:     1. Left lower quadrant pain  - US-PELVIC COMPLETE (TRANSABDOMINAL/TRANSVAGINAL) (COMBO); Future  - URINE CULTURE(NEW); Future    2. Suprapubic pain  - US-PELVIC COMPLETE (TRANSABDOMINAL/TRANSVAGINAL) (COMBO); Future  - URINE CULTURE(NEW); Future    UA is clear today.  hCG negative.  We will order a pelvic ultrasound to check a Mirena position along with left adnexa and ovary.  I will follow-up with this patient as results return.  Patient given precautionary s/sx that mandate immediate follow up and evaluation in the ED. Advised of risks of not doing so.    DDX, Supportive care, and indications for immediate follow-up discussed with patient.    Instructed to return to clinic or nearest emergency department if we are not available for any change in condition, further concerns, or worsening of symptoms.    The patient demonstrated a good understanding and agreed with the treatment plan.  Please note that this dictation was created using voice recognition software. I have made every reasonable attempt to correct obvious errors, but I expect that there are errors of grammar and possibly content that I did not discover before finalizing the note.

## 2019-08-08 LAB
BACTERIA UR CULT: NORMAL
SIGNIFICANT IND 70042: NORMAL
SITE SITE: NORMAL
SOURCE SOURCE: NORMAL

## 2019-11-05 ENCOUNTER — OFFICE VISIT (OUTPATIENT)
Dept: MEDICAL GROUP | Facility: PHYSICIAN GROUP | Age: 24
End: 2019-11-05
Payer: COMMERCIAL

## 2019-11-05 VITALS
RESPIRATION RATE: 16 BRPM | HEART RATE: 84 BPM | BODY MASS INDEX: 20.84 KG/M2 | SYSTOLIC BLOOD PRESSURE: 98 MMHG | OXYGEN SATURATION: 97 % | WEIGHT: 132.8 LBS | TEMPERATURE: 97.4 F | HEIGHT: 67 IN | DIASTOLIC BLOOD PRESSURE: 64 MMHG

## 2019-11-05 DIAGNOSIS — G43.009 MIGRAINE WITHOUT AURA AND WITHOUT STATUS MIGRAINOSUS, NOT INTRACTABLE: ICD-10-CM

## 2019-11-05 PROCEDURE — 99213 OFFICE O/P EST LOW 20 MIN: CPT | Performed by: PHYSICIAN ASSISTANT

## 2019-11-13 ENCOUNTER — OFFICE VISIT (OUTPATIENT)
Dept: MEDICAL GROUP | Facility: PHYSICIAN GROUP | Age: 24
End: 2019-11-13
Payer: COMMERCIAL

## 2019-11-13 VITALS
WEIGHT: 130.8 LBS | RESPIRATION RATE: 16 BRPM | DIASTOLIC BLOOD PRESSURE: 68 MMHG | HEIGHT: 67 IN | TEMPERATURE: 97.6 F | BODY MASS INDEX: 20.53 KG/M2 | OXYGEN SATURATION: 99 % | HEART RATE: 72 BPM | SYSTOLIC BLOOD PRESSURE: 96 MMHG

## 2019-11-13 DIAGNOSIS — G43.009 MIGRAINE WITHOUT AURA AND WITHOUT STATUS MIGRAINOSUS, NOT INTRACTABLE: ICD-10-CM

## 2019-11-13 PROCEDURE — 99213 OFFICE O/P EST LOW 20 MIN: CPT | Performed by: PHYSICIAN ASSISTANT

## 2019-11-13 RX ORDER — ONDANSETRON 4 MG/1
4 TABLET, FILM COATED ORAL EVERY 4 HOURS PRN
Qty: 20 TAB | Refills: 2 | Status: SHIPPED | OUTPATIENT
Start: 2019-11-13 | End: 2019-12-13

## 2019-11-13 RX ORDER — KETOROLAC TROMETHAMINE 30 MG/ML
30 INJECTION, SOLUTION INTRAMUSCULAR; INTRAVENOUS ONCE
Status: COMPLETED | OUTPATIENT
Start: 2019-11-13 | End: 2019-11-13

## 2019-11-13 RX ADMIN — KETOROLAC TROMETHAMINE 30 MG: 30 INJECTION, SOLUTION INTRAMUSCULAR; INTRAVENOUS at 16:35

## 2019-11-13 NOTE — LETTER
November 13, 2019    To Whom It May Concern:         This is confirmation that Mariia Wills attended her scheduled appointment with Debbi Bernal P.A.-C. on 11/13/19. Please excuse Mariia from work during acute migraine flare-ups. Do to chronic migraines she may need to miss work up to 5 days per month.          If you have any questions please do not hesitate to call me at the phone number listed below.    Sincerely,          Debbi Bernal P.A.-C.  [phone]

## 2019-11-20 NOTE — PROGRESS NOTES
Chief Complaint   Patient presents with   • Migraine     follow up        HISTORY OF PRESENT ILLNESS: Mariia Wills is an established 23 y.o. female here to discuss the evaluation and management of:    Patient is a pleasant 23-year-old female here today to follow-up on migraine symptoms.  States she is currently asymptomatic.  Patient is concerned that migraine symptoms may be associated with hormone fluctuations.  She tells me that she has a Mirena IUD.  States since her last appointment she is followed up with high severe allergy to see if allergies could be contributing to headache symptoms.  She tells me that she discovered she is allergic to beef, soy, corn, and has a gluten sensitivity.  States that if these food items or consume she does experience a headache.  Headaches are also stress-induced and when she does not get enough sleep.  Patient experiences nausea, vomiting, light sensitivity, sensitivity, and vomiting with headaches.  She is currently prescribed frovatriptan but does not take medication as prescribed due to experiencing unpleasant side effects from medication.  Migraine symptoms on average may occur 1-2 times per month.  States sleep helps alleviate symptoms.  States she feels she did not experience migraine symptoms until after IUD placement.  Patient plans on following up with her OB/GYN Dr. Crowley to discuss potentially removing IUD.  Patient is asymptomatic at this time.        Patient Active Problem List    Diagnosis Date Noted   • Galactorrhea 10/10/2018   • Migraine without aura and without status migrainosus, not intractable 08/16/2017       Allergies:Patient has no known allergies.    Current Outpatient Medications   Medication Sig Dispense Refill   • ondansetron (ZOFRAN) 4 MG Tab tablet Take 1 Tab by mouth every four hours as needed for Nausea/Vomiting for up to 30 days. 20 Tab 2   • Probiotic Product (PROBIOTIC PO) Take 1 Each by mouth every day.     • Cholecalciferol (VITAMIN D  PO) Take 1 Each by mouth every day.     • loratadine (CLARITIN) 10 MG Tab Take 10 mg by mouth every day.     • Omega-3 Fatty Acids (FISH OIL PO) Take 1 Each by mouth every day.     • Frovatriptan Succinate 2.5 MG Tab Take 2.5 mg by mouth at onset of symptoms. May repeat dose x 1 after 2h. Do not exceed more than 7.5 mg in 24 hours. 30 Tab 2   • Levonorgestrel (MIRENA, 52 MG, IU) by Intrauterine route.     • cyclobenzaprine (FLEXERIL) 5 MG tablet Take 1-2 Tabs by mouth 1 time daily as needed. (Patient not taking: Reported on 6/10/2019) 30 Tab 0     No current facility-administered medications for this visit.        Social History     Tobacco Use   • Smoking status: Never Smoker   • Smokeless tobacco: Never Used   • Tobacco comment: 5 cigarettes entire life   Substance Use Topics   • Alcohol use: Yes     Alcohol/week: 0.0 oz     Comment: once every two weeks (4-5 glasses of wine per time)   • Drug use: Yes     Types: Marijuana       Family Status   Relation Name Status   • Mo  Alive   • Fa  Alive   • Sis  Alive   • PGFa  Alive   • MGMo     • MGFa     • PGMo     • Sis  (Not Specified)     Family History   Problem Relation Age of Onset   • No Known Problems Mother    • No Known Problems Father    • Heart Disease Paternal Grandfather    • Cancer Maternal Grandmother         Colon cancer   • Lung Disease Maternal Grandfather    • Cancer Paternal Grandmother         Breast cancer   • Other Sister         migraine headaches       ROS:  Review of Systems   Constitutional: Negative for fever, chills, weight loss and malaise/fatigue.   HENT: Negative for ear pain, nosebleeds, congestion, sore throat and neck pain.    Eyes: Negative for blurred vision.   Respiratory: Negative for cough, sputum production, shortness of breath and wheezing.    Cardiovascular: Negative for chest pain, palpitations, orthopnea and leg swelling.   Gastrointestinal: Negative for heartburn, nausea, vomiting and abdominal pain.  "  Genitourinary: Negative for dysuria, urgency and frequency.   Musculoskeletal: Negative for myalgias, back pain and joint pain.   Skin: Negative for rash and itching.   Neurological: Negative for dizziness, tingling, tremors, sensory change, focal weakness. + for headaches.   Endo/Heme/Allergies: Does not bruise/bleed easily.   Psychiatric/Behavioral: Negative for depression, suicidal ideas and memory loss.  The patient is not nervous/anxious and does not have insomnia.    All other systems reviewed and are negative except as in HPI.    Exam: BP (!) 98/64 (BP Location: Right arm, Patient Position: Sitting)   Pulse 84   Temp 36.3 °C (97.4 °F) (Temporal)   Resp 16   Ht 1.702 m (5' 7\")   Wt 60.2 kg (132 lb 12.8 oz)   SpO2 97%  Body mass index is 20.8 kg/m².  General: Normal appearing. No distress.  HEENT: Normocephalic. Eyes conjunctiva clear lids without ptosis, pupils equal and reactive to light accommodation, ears normal shape and contour, canals are clear bilaterally, tympanic membranes are benign, nasal mucosa benign, oropharynx is without erythema, edema or exudates.   Neck: Supple without JVD. Thyroid is not enlarged.  Pulmonary: Clear to ausculation.  Normal effort. No rales, ronchi, or wheezing.  Cardiovascular: Regular rate and rhythm without murmur.   Abdomen: nondistended.  Neurologic: Grossly nonfocal.  Cranial nerves are normal.   Lymph: No cervical, supraclavicular or axillary lymph nodes are palpable  Skin: Warm and dry.  No rashes or suspicious skin lesions.  Musculoskeletal: Normal gait. No extremity cyanosis, clubbing, or edema.  Psych: Normal mood and affect. Alert and oriented x3. Judgment and insight is normal.    Medical decision-making and discussion:  1. Migraine without aura and without status migrainosus, not intractable  Chronic problem.  Advised patient to follow-up with Dr. Crowley in regards to IUD.  Continue work on diet, exercise, sleep hygiene.  Avoid known triggers.  Patient " referred to headache clinic for further evaluation.  Continue to monitor.  Discussed ED precautions.    Follow-up for worsening symptoms,lack of expected recovery, or should new symptoms or problems arise.    - REFERRAL TO NEUROLOGY      Please note that this dictation was created using voice recognition software. I have made every reasonable attempt to correct obvious errors, but I expect that there are errors of grammar and possibly content that I did not discover before finalizing the note.    Assessment/Plan:  1. Migraine without aura and without status migrainosus, not intractable  REFERRAL TO NEUROLOGY       No follow-ups on file.

## 2019-11-24 NOTE — PROGRESS NOTES
"Chief Complaint   Patient presents with   • Migraine      x 2 days,hot flashes ,L facial area \"feels heavy\"       HISTORY OF PRESENT ILLNESS: Mariia Wills is an established 23 y.o. female here to discuss the evaluation and management of:    Patient is a pleasant 23-year-old female here today to follow-up on migraine symptoms.  She tells me 2 days ago the left portion of her face felt heavy.  States she is also been experiencing intermittent soles of hot flashes.  She denies other neurological deficits.  Admits to nausea and one episode of vomiting.  Patient has a positive medical history for migraines.  She tells me symptoms have improved.  Patient is inquiring about treatment options.    Denies increased irritability, dry/brittle hair, dry skin, bowel habit changes, abnormal hair loss, heart palpitations, temperature intolerance.  Denies red flags such as headache worsening with bending over or with sneezing and coughing.  Denies grinding teeth.  Denies vision changes.      Patient Active Problem List    Diagnosis Date Noted   • Galactorrhea 10/10/2018   • Migraine without aura and without status migrainosus, not intractable 08/16/2017       Allergies:Patient has no known allergies.    Current Outpatient Medications   Medication Sig Dispense Refill   • ondansetron (ZOFRAN) 4 MG Tab tablet Take 1 Tab by mouth every four hours as needed for Nausea/Vomiting for up to 30 days. 20 Tab 2   • Probiotic Product (PROBIOTIC PO) Take 1 Each by mouth every day.     • Cholecalciferol (VITAMIN D PO) Take 1 Each by mouth every day.     • loratadine (CLARITIN) 10 MG Tab Take 10 mg by mouth every day.     • Omega-3 Fatty Acids (FISH OIL PO) Take 1 Each by mouth every day.     • Frovatriptan Succinate 2.5 MG Tab Take 2.5 mg by mouth at onset of symptoms. May repeat dose x 1 after 2h. Do not exceed more than 7.5 mg in 24 hours. 30 Tab 2   • Levonorgestrel (MIRENA, 52 MG, IU) by Intrauterine route.     • cyclobenzaprine " (FLEXERIL) 5 MG tablet Take 1-2 Tabs by mouth 1 time daily as needed. (Patient not taking: Reported on 6/10/2019) 30 Tab 0     No current facility-administered medications for this visit.        Social History     Tobacco Use   • Smoking status: Never Smoker   • Smokeless tobacco: Never Used   • Tobacco comment: 5 cigarettes entire life   Substance Use Topics   • Alcohol use: Yes     Alcohol/week: 0.0 oz     Comment: once every two weeks (4-5 glasses of wine per time)   • Drug use: Yes     Types: Marijuana       Family Status   Relation Name Status   • Mo  Alive   • Fa  Alive   • Sis  Alive   • PGFa  Alive   • MGMo     • MGFa     • PGMo     • Sis  (Not Specified)     Family History   Problem Relation Age of Onset   • No Known Problems Mother    • No Known Problems Father    • Heart Disease Paternal Grandfather    • Cancer Maternal Grandmother         Colon cancer   • Lung Disease Maternal Grandfather    • Cancer Paternal Grandmother         Breast cancer   • Other Sister         migraine headaches       ROS:  Review of Systems   Constitutional: Negative for fever, chills, weight loss and malaise/fatigue.   HENT: Negative for ear pain, nosebleeds, congestion, sore throat and neck pain.    Eyes: Negative for blurred vision.   Respiratory: Negative for cough, sputum production, shortness of breath and wheezing.    Cardiovascular: Negative for chest pain, palpitations, orthopnea and leg swelling.   Gastrointestinal: Negative for heartburn, nausea, vomiting and abdominal pain.   Genitourinary: Negative for dysuria, urgency and frequency.   Musculoskeletal: Negative for myalgias, back pain and joint pain.   Skin: Negative for rash and itching.   Neurological: Negative for dizziness, tingling, tremors, sensory change, focal weakness. + for headaches.   Endo/Heme/Allergies: Does not bruise/bleed easily.   Psychiatric/Behavioral: Negative for depression, suicidal ideas and memory loss.  The patient is  "not nervous/anxious and does not have insomnia.    All other systems reviewed and are negative except as in HPI.    Exam: BP (!) 96/68 (BP Location: Left arm, Patient Position: Sitting)   Pulse 72   Temp 36.4 °C (97.6 °F) (Temporal)   Resp 16   Ht 1.702 m (5' 7\")   Wt 59.3 kg (130 lb 12.8 oz)   SpO2 99%  Body mass index is 20.49 kg/m².  General: Normal appearing. No distress.  HEENT: Normocephalic. Eyes conjunctiva clear lids without ptosis, pupils equal and reactive to light accommodation, ears normal shape and contour, canals are clear bilaterally, tympanic membranes are benign, nasal mucosa benign, oropharynx is without erythema, edema or exudates.   Neck: Supple without JVD. Thyroid is not enlarged.  Pulmonary: Clear to ausculation.  Normal effort. No rales, ronchi, or wheezing.  Cardiovascular: Regular rate and rhythm without murmur. Carotid and radial pulses are intact and equal bilaterally.  Abdomen: Soft, nontender, nondistended. Normal bowel sounds. Liver and spleen are not palpable  Neurologic: Grossly nonfocal.  Cranial nerves are normal.  Lymph: No cervical, supraclavicular or axillary lymph nodes are palpable  Skin: Warm and dry.  No rashes or suspicious skin lesions.  Musculoskeletal: Normal gait. No extremity cyanosis, clubbing, or edema.  Psych: Normal mood and affect. Alert and oriented x3. Judgment and insight is normal.    Medical decision-making and discussion:  1. Migraine without aura and without status migrainosus, not intractable  Patient was given a ketorolac injection without complications.  Advised patient to take over-the-counter Benadryl when she gets home.  Continue working on healthy coping mechanisms for stress and anxiety.  Avoid known triggers.  Advised patient to follow-up with her gynecologist in regards to IUD.  Continue to monitor closely.  Patient may need to be on a prophylactic medication.  Discussed ED precautions with patient.  Provided patient with a work excuse " note.    - ketorolac (TORADOL) injection 30 mg  - ondansetron (ZOFRAN) 4 MG Tab tablet; Take 1 Tab by mouth every four hours as needed for Nausea/Vomiting for up to 30 days.  Dispense: 20 Tab; Refill: 2      Please note that this dictation was created using voice recognition software. I have made every reasonable attempt to correct obvious errors, but I expect that there are errors of grammar and possibly content that I did not discover before finalizing the note.    Assessment/Plan:  1. Migraine without aura and without status migrainosus, not intractable  ketorolac (TORADOL) injection 30 mg    ondansetron (ZOFRAN) 4 MG Tab tablet       No follow-ups on file.

## 2020-02-19 ENCOUNTER — OFFICE VISIT (OUTPATIENT)
Dept: MEDICAL GROUP | Facility: PHYSICIAN GROUP | Age: 25
End: 2020-02-19
Payer: COMMERCIAL

## 2020-02-19 VITALS
OXYGEN SATURATION: 97 % | TEMPERATURE: 97.8 F | WEIGHT: 129.8 LBS | HEIGHT: 67 IN | RESPIRATION RATE: 16 BRPM | HEART RATE: 84 BPM | DIASTOLIC BLOOD PRESSURE: 60 MMHG | SYSTOLIC BLOOD PRESSURE: 94 MMHG | BODY MASS INDEX: 20.37 KG/M2

## 2020-02-19 DIAGNOSIS — I73.00 RAYNAUD'S PHENOMENON WITHOUT GANGRENE: ICD-10-CM

## 2020-02-19 DIAGNOSIS — G43.009 MIGRAINE WITHOUT AURA AND WITHOUT STATUS MIGRAINOSUS, NOT INTRACTABLE: ICD-10-CM

## 2020-02-19 PROCEDURE — 99213 OFFICE O/P EST LOW 20 MIN: CPT | Performed by: PHYSICIAN ASSISTANT

## 2020-02-19 ASSESSMENT — PATIENT HEALTH QUESTIONNAIRE - PHQ9: CLINICAL INTERPRETATION OF PHQ2 SCORE: 0

## 2020-02-19 NOTE — PROGRESS NOTES
Chief Complaint   Patient presents with   • Migraine     follow up   • Hand Problem     wants to be tested for raynauds or poor circulation        HISTORY OF PRESENT ILLNESS: Mariia Wills is an established 24 y.o. female here to discuss the evaluation and management of:    Patient is a pleasant 24-year-old female here today to follow-up on migraines and discuss ray nods phenomenon.  She tells me in November 2019 her IUD was removed and since then she has not experienced a migraine up until 3 weeks ago.  States 3 weeks ago while trying to ski for the first time her fingers turned white and blue.  States it took almost 40 minutes for him to return to flesh color.  States this is not abnormal for her.  She tells me she recalls that she started experiencing these symptoms at 10 years old.  State symptoms only occur when hands are cold.  She denies abnormal healing or lesions.  Denies gangrene.  States once hands are warm symptoms resolved.  Patient does not smoke.  She does not want to try medications at this time to have alleviate symptoms.  She admits that every day she experiences symptoms and depending on how cold it is the symptoms vary in severity.  States after she experience her hands turning white/blue during scanning the following day she developed a migraine.  Migraine symptoms are similar to past migraines.  Patient is unable to recall what triggered the migraine.  She is asymptomatic at this time.      Patient Active Problem List    Diagnosis Date Noted   • Galactorrhea 10/10/2018   • Migraine without aura and without status migrainosus, not intractable 08/16/2017       Allergies:Patient has no known allergies.    Current Outpatient Medications   Medication Sig Dispense Refill   • Probiotic Product (PROBIOTIC PO) Take 1 Each by mouth every day.     • Cholecalciferol (VITAMIN D PO) Take 1 Each by mouth every day.     • loratadine (CLARITIN) 10 MG Tab Take 10 mg by mouth every day.     • Omega-3 Fatty  Acids (FISH OIL PO) Take 1 Each by mouth every day.     • Frovatriptan Succinate 2.5 MG Tab Take 2.5 mg by mouth at onset of symptoms. May repeat dose x 1 after 2h. Do not exceed more than 7.5 mg in 24 hours. 30 Tab 2   • Levonorgestrel (MIRENA, 52 MG, IU) by Intrauterine route.     • cyclobenzaprine (FLEXERIL) 5 MG tablet Take 1-2 Tabs by mouth 1 time daily as needed. (Patient not taking: Reported on 6/10/2019) 30 Tab 0     No current facility-administered medications for this visit.        Social History     Tobacco Use   • Smoking status: Never Smoker   • Smokeless tobacco: Never Used   • Tobacco comment: 5 cigarettes entire life   Substance Use Topics   • Alcohol use: Yes     Alcohol/week: 0.0 oz     Comment: once every two weeks (4-5 glasses of wine per time)   • Drug use: Yes     Types: Marijuana       Family Status   Relation Name Status   • Mo  Alive   • Fa  Alive   • Sis  Alive   • PGFa  Alive   • MGMo     • MGFa     • PGMo     • Sis  (Not Specified)     Family History   Problem Relation Age of Onset   • No Known Problems Mother    • No Known Problems Father    • Heart Disease Paternal Grandfather    • Cancer Maternal Grandmother         Colon cancer   • Lung Disease Maternal Grandfather    • Cancer Paternal Grandmother         Breast cancer   • Other Sister         migraine headaches       ROS:  Review of Systems   Constitutional: Negative for fever, chills, weight loss and malaise/fatigue.   HENT: Negative for ear pain, nosebleeds, congestion, sore throat and neck pain.    Eyes: Negative for blurred vision.   Respiratory: Negative for cough, sputum production, shortness of breath and wheezing.    Cardiovascular: Negative for chest pain, palpitations, orthopnea and leg swelling.   Gastrointestinal: Negative for heartburn, nausea, vomiting and abdominal pain.   Genitourinary: Negative for dysuria, urgency and frequency.   Musculoskeletal: Negative for myalgias, back pain and joint  "pain.   Skin: Negative for rash and itching.   Neurological: Negative for dizziness, tingling, tremors, sensory change, focal weakness and headaches.   Endo/Heme/Allergies: Does not bruise/bleed easily.   Psychiatric/Behavioral: Negative for depression, suicidal ideas and memory loss.  The patient is not nervous/anxious and does not have insomnia.    All other systems reviewed and are negative except as in HPI.    Exam: BP (!) 94/60 (BP Location: Left arm, Patient Position: Sitting)   Pulse 84   Temp 36.6 °C (97.8 °F) (Temporal)   Resp 16   Ht 1.702 m (5' 7\")   Wt 58.9 kg (129 lb 12.8 oz)   SpO2 97%  Body mass index is 20.33 kg/m².  General: Normal appearing. No distress.  HEENT: Normocephalic. Eyes conjunctiva clear lids without ptosis, ears normal shape and contour.  Neck: Supple without JVD. Thyroid is not enlarged.  Pulmonary: Clear to ausculation.  Normal effort. No rales, ronchi, or wheezing.  Cardiovascular: Regular rate and rhythm without murmur.   Abdomen: Soft, nontender, nondistended.  Neurologic: Grossly nonfocal.  Cranial nerves are normal.   Lymph: No cervical or supraclavicular lymph nodes are palpable  Skin: Warm and dry.  No rashes or suspicious skin lesions.  Musculoskeletal: Normal gait. No extremity cyanosis, clubbing, or edema.  Psych: Normal mood and affect. Alert and oriented x3. Judgment and insight is normal.    Medical decision-making and discussion:  1. Raynaud's phenomenon without gangrene  Advised patient to keep hands warm.  Avoid known triggers.  Suggested hand warmers and warm gloves.  Discussed treatment options patient patient does not want to proceed with medication as a treatment option.    Follow-up for worsening symptoms,lack of expected recovery, or should new symptoms or problems arise.      2. Migraine without aura and without status migrainosus, not intractable  Chronic with stable problem.  Patient is asymptomatic at this time.  Since her last appointment she is had 1 " migraine.  Patient has been furred to headache clinic.  Vies patient to follow-up.  Patient read to plan.  Continue work on diet, exercise, sleep hygiene.  Continue developing healthy coping mechanisms for stress anxiety.    Please note that this dictation was created using voice recognition software. I have made every reasonable attempt to correct obvious errors, but I expect that there are errors of grammar and possibly content that I did not discover before finalizing the note.    Assessment/Plan:  1. Raynaud's phenomenon without gangrene     2. Migraine without aura and without status migrainosus, not intractable         Return if symptoms worsen or fail to improve.

## 2020-05-26 ENCOUNTER — OFFICE VISIT (OUTPATIENT)
Dept: NEUROLOGY | Facility: MEDICAL CENTER | Age: 25
End: 2020-05-26
Payer: COMMERCIAL

## 2020-05-26 VITALS
HEIGHT: 67 IN | HEART RATE: 74 BPM | OXYGEN SATURATION: 89 % | BODY MASS INDEX: 20.8 KG/M2 | SYSTOLIC BLOOD PRESSURE: 110 MMHG | RESPIRATION RATE: 16 BRPM | DIASTOLIC BLOOD PRESSURE: 70 MMHG | TEMPERATURE: 98.7 F | WEIGHT: 132.5 LBS

## 2020-05-26 DIAGNOSIS — G43.009 MIGRAINE WITHOUT AURA AND WITHOUT STATUS MIGRAINOSUS, NOT INTRACTABLE: ICD-10-CM

## 2020-05-26 PROCEDURE — 99204 OFFICE O/P NEW MOD 45 MIN: CPT | Performed by: NURSE PRACTITIONER

## 2020-05-26 ASSESSMENT — ENCOUNTER SYMPTOMS
BLURRED VISION: 0
HEARTBURN: 0
BACK PAIN: 1
VOMITING: 0
COUGH: 0
DIZZINESS: 0
SEIZURES: 0
CHILLS: 0
DOUBLE VISION: 0
DIARRHEA: 0
BRUISES/BLEEDS EASILY: 0
NAUSEA: 0
NECK PAIN: 1
HEADACHES: 1
FEVER: 0
CONSTIPATION: 0
NERVOUS/ANXIOUS: 1
MYALGIAS: 0
DEPRESSION: 1
SHORTNESS OF BREATH: 0

## 2020-05-26 NOTE — PATIENT INSTRUCTIONS
Start magnesium oxide 400mg by mouth every night, may take extra dose if needed for headache (over the counter), hold for diarrhea         Start Riboflavin (Vitamin B2) 400mg by mouth every night (over the counter),may turn urine bright yellow         Start COQ 10, take 200mg every night. (over the counter)          Attempt to go to bed and get up at the same time every night           Eat meals on regular basis            Stay hydrated.             Aerobic exercise 30 minutes daily             Avoid aged or smoked foods, avoid processed foods, red wine, aged cheese              Keep headache diary, include foods that you may have eaten.             Avoid overusing over the counter medications:  Do not take more than 500mg acetaminophen (tylenol), more than 4 times weekly, more frequent or larger doses are associated with medication overuse headache.      Migraine Headache  A migraine headache is a very strong throbbing pain on one side or both sides of your head. Migraines can also cause other symptoms. Talk with your doctor about what things may bring on (trigger) your migraine headaches.  Follow these instructions at home:  Medicines  · Take over-the-counter and prescription medicines only as told by your doctor.  · Do not drive or use heavy machinery while taking prescription pain medicine.  · To prevent or treat constipation while you are taking prescription pain medicine, your doctor may recommend that you:  ¨ Drink enough fluid to keep your pee (urine) clear or pale yellow.  ¨ Take over-the-counter or prescription medicines.  ¨ Eat foods that are high in fiber. These include fresh fruits and vegetables, whole grains, and beans.  ¨ Limit foods that are high in fat and processed sugars. These include fried and sweet foods.  Lifestyle  · Avoid alcohol.  · Do not use any products that contain nicotine or tobacco, such as cigarettes and e-cigarettes. If you need help quitting, ask your doctor.  · Get at least 8  hours of sleep every night.  · Limit your stress.  General instructions  · Keep a journal to find out what may bring on your migraines. For example, write down:  ¨ What you eat and drink.  ¨ How much sleep you get.  ¨ Any change in what you eat or drink.  ¨ Any change in your medicines.  · If you have a migraine:  ¨ Avoid things that make your symptoms worse, such as bright lights.  ¨ It may help to lie down in a dark, quiet room.  ¨ Do not drive or use heavy machinery.  ¨ Ask your doctor what activities are safe for you.  · Keep all follow-up visits as told by your doctor. This is important.  Contact a doctor if:  · You get a migraine that is different or worse than your usual migraines.  Get help right away if:  · Your migraine gets very bad.  · You have a fever.  · You have a stiff neck.  · You have trouble seeing.  · Your muscles feel weak or like you cannot control them.  · You start to lose your balance a lot.  · You start to have trouble walking.  · You pass out (faint).  This information is not intended to replace advice given to you by your health care provider. Make sure you discuss any questions you have with your health care provider.  Document Released: 09/26/2009 Document Revised: 07/07/2017 Document Reviewed: 06/05/2017  Elsevier Interactive Patient Education © 2017 [employer]

## 2020-05-26 NOTE — PROGRESS NOTES
Subjective:    HPI  Mariia Wills is a 24 y.o. female who presents with chronic migraine, 8-10 monthly, lasting up to 3 days.  Usually wakes up with the headache, cannot eat the first day.  She had Mirena removed in November, she is not seeing a correlation between her menstrual cycles and her migraines.  She states that the head pain is so bad at times she wishes she was dead, she denies ever coming up with a plan, does not have any weapons at home, denies any suicidal thoughts outside of headaches.        Onset:   16  Triggers:  Unable to identify  Alleviating factors:   Marijuana,   Meds tried and result:   Frovatriptan sometimes helps, usually does not not.  Took something in high school that made her dizzy, she doesn't remember what it was.  Zofran helps with nausea. Has never been on preventatives.  She has tried 2 other triptans, doesn't remember what they were, they were ineffective mainly because she woke up with the headache.  Hormones:   Had Mirena out in November.  Not currently on hormone.  Caffeine use:  1 cup of coffee daily  OTC medications--frequency?   Takes Advil PM one every night when she is having the headaches.  Smoking:  Marijuana only,  no cigarettes.  Sleep apnea?:   None noted.  Family Hx:  none  How many per month:  Up to 8-10 monthly, then may have periods where they improve for a few months, last one was 2 weeks ago, it lasted for 3 days.  Quality:   Left side front to back, pressure, sharp and stabbing, feels like she needs to let the pressure out.  N&V:  Yes, takes Zofran  Photo/phonophobia:   Photophobia only  Aura:  None      Review of Systems   Constitutional: Negative for chills and fever.   HENT: Negative for hearing loss.    Eyes: Negative for blurred vision and double vision.   Respiratory: Negative for cough and shortness of breath.    Cardiovascular: Negative for chest pain.   Gastrointestinal: Negative for constipation, diarrhea, heartburn, nausea and vomiting.  "  Genitourinary: Negative for dysuria.   Musculoskeletal: Positive for back pain and neck pain. Negative for myalgias.   Skin: Negative for rash.   Neurological: Positive for headaches. Negative for dizziness and seizures.   Endo/Heme/Allergies: Does not bruise/bleed easily.   Psychiatric/Behavioral: Positive for depression. The patient is nervous/anxious.         Mostly anxiety, occasional depression.          Objective:     /70 (BP Location: Left arm, Patient Position: Sitting, BP Cuff Size: Adult)   Pulse 74   Temp 37.1 °C (98.7 °F) (Temporal)   Resp 16   Ht 1.702 m (5' 7\")   Wt 60.1 kg (132 lb 7.9 oz)   LMP 05/16/2020 (Exact Date)   SpO2 89%   BMI 20.75 kg/m²      Physical Exam     General:  Alert, no apparent distress,  Psych:   mood and affect WNL  Eyes:     No optic disc edema  Neuro:   Oriented X 4, speech fluent, naming and memory intact                 cranial nerves II-XII intact                 Strength 5/5 BUE/BLE, no drift                  Sensation to PP equal bilaterally                 No limb ataxia with finger to nose and heel to shin                 Ambulates with steady gait.                Biceps,brachioradialis, tricep, patellar and ankle reflex all 2+    Cardiovascular:    S1S2, no abnormal rhythm auscultated, no peripheral edema  Neck:                     No carotid bruits noted   Pulmonary:            Respirations easy, lungs clear to auscultation all fields.    Skin:                     No obvious rashes.       History reviewed. No pertinent past medical history.     Current Outpatient Medications on File Prior to Visit   Medication Sig Dispense Refill   • Probiotic Product (PROBIOTIC PO) Take 1 Each by mouth every day.     • Cholecalciferol (VITAMIN D PO) Take 1 Each by mouth every day.     • loratadine (CLARITIN) 10 MG Tab Take 10 mg by mouth every day.     • Omega-3 Fatty Acids (FISH OIL PO) Take 1 Each by mouth every day.     • Frovatriptan Succinate 2.5 MG Tab Take 2.5 " mg by mouth at onset of symptoms. May repeat dose x 1 after 2h. Do not exceed more than 7.5 mg in 24 hours. 30 Tab 2     No current facility-administered medications on file prior to visit.         Assessment/Plan:       1. Migraine without aura and without status migrainosus, not intractable        Ubrelvy  100mg 1/2 tab as needed for migraine, may repeat dose in 2 hours if not relieved.   No more than 200mg in 24 hour period.    Emgality 120mg subcutaneously monthly, loading dose of 240mg first month.    2..  Will consider Topiramate 25mg QHS with increase to 50mg as tolerated if patient gets copper IUD, she will send me a message.          Would avoid propranol due to hypotension         Avoid SSRI due to suicidal thoughts during migraine episodes.     Start magnesium oxide 400mg by mouth every night, may take extra dose if needed for headache (over the counter), hold for diarrhea         Start Riboflavin (Vitamin B2) 400mg by mouth every night (over the counter),may turn urine bright yellow         Start COQ 10, take 200mg every night. (over the counter)          Attempt to go to bed and get up at the same time every night           Eat meals on regular basis            Stay hydrated.             Aerobic exercise 30 minutes daily             Avoid aged or smoked foods, avoid processed foods, red wine, aged cheese              Keep headache diary, include foods that you may have eaten.             Avoid overusing over the counter medications:  Do not take more than 500mg acetaminophen (tylenol), more than 4 times weekly, more frequent or larger doses are associated with medication overuse headache.    Follow up in 3 months

## 2020-08-06 ENCOUNTER — APPOINTMENT (OUTPATIENT)
Dept: NEUROLOGY | Facility: MEDICAL CENTER | Age: 25
End: 2020-08-06
Payer: COMMERCIAL

## 2020-11-20 ENCOUNTER — OFFICE VISIT (OUTPATIENT)
Dept: URGENT CARE | Facility: PHYSICIAN GROUP | Age: 25
End: 2020-11-20
Payer: COMMERCIAL

## 2020-11-20 ENCOUNTER — HOSPITAL ENCOUNTER (OUTPATIENT)
Facility: MEDICAL CENTER | Age: 25
End: 2020-11-20
Attending: PHYSICIAN ASSISTANT
Payer: COMMERCIAL

## 2020-11-20 VITALS
RESPIRATION RATE: 14 BRPM | DIASTOLIC BLOOD PRESSURE: 80 MMHG | SYSTOLIC BLOOD PRESSURE: 122 MMHG | HEIGHT: 67 IN | HEART RATE: 70 BPM | OXYGEN SATURATION: 100 % | BODY MASS INDEX: 21.03 KG/M2 | WEIGHT: 134 LBS | TEMPERATURE: 97.8 F

## 2020-11-20 DIAGNOSIS — R06.02 SHORTNESS OF BREATH: ICD-10-CM

## 2020-11-20 DIAGNOSIS — J06.9 VIRAL URI WITH COUGH: ICD-10-CM

## 2020-11-20 PROCEDURE — U0003 INFECTIOUS AGENT DETECTION BY NUCLEIC ACID (DNA OR RNA); SEVERE ACUTE RESPIRATORY SYNDROME CORONAVIRUS 2 (SARS-COV-2) (CORONAVIRUS DISEASE [COVID-19]), AMPLIFIED PROBE TECHNIQUE, MAKING USE OF HIGH THROUGHPUT TECHNOLOGIES AS DESCRIBED BY CMS-2020-01-R: HCPCS

## 2020-11-20 PROCEDURE — 99214 OFFICE O/P EST MOD 30 MIN: CPT | Performed by: PHYSICIAN ASSISTANT

## 2020-11-20 ASSESSMENT — ENCOUNTER SYMPTOMS
VOMITING: 0
NAUSEA: 0
MYALGIAS: 0
SINUS PAIN: 0
EYE PAIN: 0
SPUTUM PRODUCTION: 0
ABDOMINAL PAIN: 0
SORE THROAT: 0
DIARRHEA: 0
HEADACHES: 0
DIZZINESS: 0
PALPITATIONS: 0
FEVER: 0
CHILLS: 0
SHORTNESS OF BREATH: 1
RHINORRHEA: 0
BLURRED VISION: 0
COUGH: 1
WHEEZING: 0

## 2020-11-20 NOTE — LETTER
November 20, 2020       Patient: Mariia Wills   YOB: 1995   Date of Visit: 11/20/2020       To Whom it May Concern:    Mariia Wilsl was seen in my clinic on 11/20/2020. A concern for COVID-19 has been identified and testing is in progress. They will be able to access their results through our electronic delivery system called [employer].     We are asking you to excuse absences while they follow self-isolation protocol per CDC guidelines.   • 10 days since symptoms first appeared and   • 24 hours with no fever without the use of fever-reducing medications and   • Other symptoms of COVID-19 are improving*  *Loss of taste and smell may persist for weeks or months after recovery and need not delay the end of isolation    Most people do not require testing to decide when they can be around others. If results are negative your employee must continue to follow the self-isolation protocol.    If the results of testing are positive then they will be contacted by the On license of [facility] for further instructions on duration of self-isolation and return to work protocol. In general this will also follow the CDC guidelines with a minimum of 10 days from the onset with improving symptoms and no fever.    This is the only note that will be provided from [facility] for this visit. Please schedule a visit with primary care if documents for FMLA, disability, or unemployment are required.       If you have any questions or concerns, please don't hesitate to call.        Sincerely,           Cyndi Miranda P.A.-C.  Electronically Signed

## 2020-11-20 NOTE — PROGRESS NOTES
Subjective:      Mariia Wills is a 24 y.o. female who presents with Cough (x3 days. cough, tightness in chest )      Cough  This is a new problem. The current episode started in the past 7 days (3 days). The problem has been unchanged. The cough is non-productive. Associated symptoms include nasal congestion and shortness of breath (with chest tightness). Pertinent negatives include no chest pain, chills, ear pain, fever, headaches, myalgias, rash, rhinorrhea, sore throat or wheezing. Exacerbated by: drinking cold liquids. She has tried nothing for the symptoms. Her past medical history is significant for environmental allergies. There is no history of asthma or pneumonia.   Patient states that her boyfriend had a possible exposure and is undergoing testing today.  His results are still pending.    Review of Systems   Constitutional: Negative for chills, fever and malaise/fatigue.   HENT: Positive for congestion. Negative for ear pain, rhinorrhea, sinus pain and sore throat.    Eyes: Negative for blurred vision and pain.   Respiratory: Positive for cough and shortness of breath (with chest tightness). Negative for sputum production and wheezing.    Cardiovascular: Negative for chest pain and palpitations.   Gastrointestinal: Negative for abdominal pain, diarrhea, nausea and vomiting.   Musculoskeletal: Negative for myalgias.   Skin: Negative for rash.   Neurological: Negative for dizziness and headaches.   Endo/Heme/Allergies: Positive for environmental allergies.       PMH:  has no past medical history on file.  MEDS:   Current Outpatient Medications:   •  Cholecalciferol (VITAMIN D PO), Take 1 Each by mouth every day., Disp: , Rfl:   •  Frovatriptan Succinate 2.5 MG Tab, Take 2.5 mg by mouth at onset of symptoms. May repeat dose x 1 after 2h. Do not exceed more than 7.5 mg in 24 hours., Disp: 30 Tab, Rfl: 2  •  NON SPECIFIED, Emgality 120mg injection once montly, start with 240mg first month loading dose.  "(Patient not taking: Reported on 11/20/2020), Disp: 3 Each, Rfl: 1  •  NON SPECIFIED, Ubrelvy 100mg Take 1/2-1 tablet po at onset of headache, may repeat dose in 2 hours if unrelieved.  Do not exceed more than 2 tablets (200mg)  in 24 hours. (Patient not taking: Reported on 11/20/2020), Disp: 10 Each, Rfl: 3  •  Probiotic Product (PROBIOTIC PO), Take 1 Each by mouth every day., Disp: , Rfl:   •  loratadine (CLARITIN) 10 MG Tab, Take 10 mg by mouth every day., Disp: , Rfl:   •  Omega-3 Fatty Acids (FISH OIL PO), Take 1 Each by mouth every day., Disp: , Rfl:   ALLERGIES:   Allergies   Allergen Reactions   • Food      Melons and bananas, rash     SURGHX: No past surgical history on file.  SOCHX:  reports that she has never smoked. She has never used smokeless tobacco. She reports current alcohol use. She reports current drug use. Drug: Marijuana.  FH: Family history was reviewed, no pertinent findings to report     Objective:     /80   Pulse 70   Temp 36.6 °C (97.8 °F) (Temporal)   Resp 14   Ht 1.702 m (5' 7\")   Wt 60.8 kg (134 lb)   SpO2 100%   BMI 20.99 kg/m²      Physical Exam  Constitutional:       Appearance: She is well-developed.   HENT:      Head: Normocephalic and atraumatic.      Right Ear: Hearing, tympanic membrane, ear canal and external ear normal.      Left Ear: Hearing, tympanic membrane, ear canal and external ear normal.      Nose: Mucosal edema and congestion present. No rhinorrhea.      Right Turbinates: Swollen.      Left Turbinates: Swollen.      Mouth/Throat:      Lips: Pink.      Mouth: Mucous membranes are moist.      Pharynx: Oropharynx is clear.   Eyes:      Conjunctiva/sclera: Conjunctivae normal.      Pupils: Pupils are equal, round, and reactive to light.   Neck:      Musculoskeletal: Normal range of motion.   Cardiovascular:      Rate and Rhythm: Normal rate and regular rhythm.      Heart sounds: Normal heart sounds. No murmur.   Pulmonary:      Effort: Pulmonary effort is " normal. No respiratory distress.      Breath sounds: Normal breath sounds. No decreased breath sounds, wheezing, rhonchi or rales.   Lymphadenopathy:      Cervical: No cervical adenopathy.   Skin:     General: Skin is warm and dry.      Capillary Refill: Capillary refill takes less than 2 seconds.   Neurological:      Mental Status: She is alert and oriented to person, place, and time.   Psychiatric:         Behavior: Behavior normal.         Judgment: Judgment normal.            Assessment/Plan:     1. Viral URI with cough  - COVID/SARS COV-2 PCR; Future  - OTC cold/flu medications  - PO fluids  - Rest  - Tylenol or ibuprofen as needed for fever > 100.4 F    2. Shortness of breath  - COVID/SARS COV-2 PCR; Future  *Patient is having some shortness of breath and chest tightness but her lungs are clear to auscultation bilaterally, she is speaking in full sentences without difficulty, and her resting oxygen saturation is 100% on room air.  Will defer any imaging at this time.  If symptoms worsen a little bit she can return to the urgent care for reevaluation.  If she gets a significantly worsening amount of shortness of breath (especially with the inability to speak in full sentences) or persistent chest pain she will go to the ER.        *Patient had a nasal swab to test for COVID-19 virus.  Patient was advised to stay home and self isolate/self quarantine while awaiting the results.  Supportive care was reiterated.  Return/ER precautions discussed.         Differential Diagnosis, natural history, and supportive care discussed. Return to the Urgent Care or follow up with your PCP if symptoms fail to resolve, or for any new or worsening symptoms. Emergency room precautions discussed. Patient and/or family appears understanding of information.

## 2020-11-22 LAB — COVID ORDER STATUS COVID19: NORMAL

## 2020-11-23 LAB
SARS-COV-2 RNA RESP QL NAA+PROBE: NOTDETECTED
SPECIMEN SOURCE: NORMAL

## 2023-04-05 ENCOUNTER — OFFICE VISIT (OUTPATIENT)
Dept: URGENT CARE | Facility: CLINIC | Age: 28
End: 2023-04-05
Payer: COMMERCIAL

## 2023-04-05 VITALS
WEIGHT: 137 LBS | HEART RATE: 88 BPM | HEIGHT: 67 IN | DIASTOLIC BLOOD PRESSURE: 72 MMHG | TEMPERATURE: 98.6 F | RESPIRATION RATE: 16 BRPM | SYSTOLIC BLOOD PRESSURE: 118 MMHG | BODY MASS INDEX: 21.5 KG/M2 | OXYGEN SATURATION: 98 %

## 2023-04-05 DIAGNOSIS — J06.9 VIRAL URI: ICD-10-CM

## 2023-04-05 DIAGNOSIS — J02.9 PHARYNGITIS, UNSPECIFIED ETIOLOGY: ICD-10-CM

## 2023-04-05 LAB — S PYO DNA SPEC NAA+PROBE: NOT DETECTED

## 2023-04-05 PROCEDURE — 87651 STREP A DNA AMP PROBE: CPT | Performed by: NURSE PRACTITIONER

## 2023-04-05 PROCEDURE — 99213 OFFICE O/P EST LOW 20 MIN: CPT | Performed by: NURSE PRACTITIONER

## 2023-04-05 ASSESSMENT — ENCOUNTER SYMPTOMS
MYALGIAS: 0
FEVER: 0
NAUSEA: 0
DIZZINESS: 0
SWOLLEN GLANDS: 1
VOMITING: 0
SHORTNESS OF BREATH: 0
CHILLS: 0
EYE PAIN: 0
SORE THROAT: 1
COUGH: 1

## 2023-04-05 NOTE — LETTER
April 5, 2023         Patient: Mariia Wills   YOB: 1995   Date of Visit: 4/5/2023           To Whom it May Concern:    Mariia Wills was seen in my clinic on 4/5/2023. She may return to work on 4/7/23.    If you have any questions or concerns, please don't hesitate to call.        Sincerely,           CEE Manning.  Electronically Signed

## 2023-04-06 NOTE — PROGRESS NOTES
Subjective:   Mariia Wills is a 27 y.o. female who presents for Pharyngitis and Cough      Pharyngitis   This is a new problem. The current episode started yesterday. The problem has been gradually improving. The pain is mild. Associated symptoms include congestion, coughing, a plugged ear sensation and swollen glands. Pertinent negatives include no ear discharge, shortness of breath or vomiting. She has had no exposure to strep or mono. She has tried acetaminophen for the symptoms. The treatment provided no relief.     Review of Systems   Constitutional:  Negative for chills and fever.   HENT:  Positive for congestion and sore throat. Negative for ear discharge.    Eyes:  Negative for pain.   Respiratory:  Positive for cough. Negative for shortness of breath.    Cardiovascular:  Negative for chest pain.   Gastrointestinal:  Negative for nausea and vomiting.   Genitourinary:  Negative for hematuria.   Musculoskeletal:  Negative for myalgias.   Skin:  Negative for rash.   Neurological:  Negative for dizziness.     Medications:    FISH OIL PO  loratadine Tabs  PROBIOTIC PO    Allergies: Food    Problem List: Mariia Wills does not have any pertinent problems on file.    Surgical History:  No past surgical history on file.    Past Social Hx: Mariia Wills  reports that she has never smoked. She has never used smokeless tobacco. She reports current alcohol use. She reports current drug use. Drug: Marijuana.     Past Family Hx:  Mariia Wills family history includes Cancer in her maternal grandmother and paternal grandmother; Heart Disease in her paternal grandfather; Lung Disease in her maternal grandfather; No Known Problems in her father and mother; Other in her sister.     Problem list, medications, and allergies reviewed by myself today in Epic.     Objective:     /72 (BP Location: Right arm, Patient Position: Sitting, BP Cuff Size: Adult long)   Pulse 88   Temp 37 °C (98.6 °F)  "(Temporal)   Resp 16   Ht 1.702 m (5' 7\")   Wt 62.1 kg (137 lb)   SpO2 98%   BMI 21.46 kg/m²     Physical Exam  Vitals and nursing note reviewed.   Constitutional:       General: She is not in acute distress.     Appearance: She is well-developed.   HENT:      Head: Normocephalic and atraumatic.      Right Ear: Tympanic membrane and external ear normal.      Left Ear: Tympanic membrane and external ear normal.      Nose: Nose normal.      Right Sinus: No maxillary sinus tenderness or frontal sinus tenderness.      Left Sinus: No maxillary sinus tenderness or frontal sinus tenderness.      Mouth/Throat:      Mouth: Mucous membranes are moist.      Pharynx: Uvula midline. No posterior oropharyngeal erythema.      Tonsils: No tonsillar exudate or tonsillar abscesses.   Eyes:      General:         Right eye: No discharge.         Left eye: No discharge.      Conjunctiva/sclera: Conjunctivae normal.   Cardiovascular:      Rate and Rhythm: Normal rate.   Pulmonary:      Effort: Pulmonary effort is normal. No respiratory distress.      Breath sounds: Normal breath sounds.   Abdominal:      General: There is no distension.   Musculoskeletal:         General: Normal range of motion.   Lymphadenopathy:      Head:      Right side of head: Tonsillar adenopathy present.      Left side of head: Tonsillar adenopathy present.      Cervical: No cervical adenopathy.   Skin:     General: Skin is warm and dry.   Neurological:      General: No focal deficit present.      Mental Status: She is alert and oriented to person, place, and time. Mental status is at baseline.      Gait: Gait (gait at baseline) normal.   Psychiatric:         Judgment: Judgment normal.       Assessment/Plan:     Diagnosis and associated orders:     1. Pharyngitis, unspecified etiology  POCT GROUP A STREP, PCR      2. Viral URI             Comments/MDM:     Strep negative  It was explained today that due to the viral nature of the pt's illness, we will treat " symptomatically today.   Encouraged OTC supportive meds PRN. Humidification, increase fluids,   Discussed side effects of OTC meds and any prescribed.  Given precautionary s/sx that mandate immediate follow up and evaluation in the ED. Advised of risks of not doing so.    DDX, Supportive care, and indications for immediate follow-up discussed with patient.    Instructed to return to clinic or nearest emergency department if we are not available for any change in condition, further concerns, or worsening of symptoms.    The patient  and/or guardian demonstrated a good understanding and agreed with the treatment plan.                 Please note that this dictation was created using voice recognition software. I have made a reasonable attempt to correct obvious errors, but I expect that there are errors of grammar and possibly content that I did not discover before finalizing the note.    This note was electronically signed by Jules SÁNCHEZ.

## 2023-04-18 ENCOUNTER — HOSPITAL ENCOUNTER (OUTPATIENT)
Facility: MEDICAL CENTER | Age: 28
End: 2023-04-18
Attending: NURSE PRACTITIONER
Payer: COMMERCIAL

## 2023-04-18 ENCOUNTER — OFFICE VISIT (OUTPATIENT)
Dept: URGENT CARE | Facility: CLINIC | Age: 28
End: 2023-04-18
Payer: COMMERCIAL

## 2023-04-18 VITALS
BODY MASS INDEX: 24.63 KG/M2 | HEIGHT: 63 IN | RESPIRATION RATE: 14 BRPM | HEART RATE: 104 BPM | OXYGEN SATURATION: 100 % | DIASTOLIC BLOOD PRESSURE: 56 MMHG | SYSTOLIC BLOOD PRESSURE: 98 MMHG | TEMPERATURE: 99 F | WEIGHT: 139 LBS

## 2023-04-18 DIAGNOSIS — J03.90 TONSILLITIS: ICD-10-CM

## 2023-04-18 DIAGNOSIS — J02.9 PHARYNGITIS, UNSPECIFIED ETIOLOGY: ICD-10-CM

## 2023-04-18 LAB
HETEROPH AB SER QL LA: NEGATIVE
POCT INT CON NEG: NEGATIVE
POCT INT CON POS: POSITIVE
S PYO DNA SPEC NAA+PROBE: NOT DETECTED

## 2023-04-18 PROCEDURE — 87070 CULTURE OTHR SPECIMN AEROBIC: CPT

## 2023-04-18 PROCEDURE — 86308 HETEROPHILE ANTIBODY SCREEN: CPT | Performed by: NURSE PRACTITIONER

## 2023-04-18 PROCEDURE — 87077 CULTURE AEROBIC IDENTIFY: CPT

## 2023-04-18 PROCEDURE — 87651 STREP A DNA AMP PROBE: CPT | Performed by: NURSE PRACTITIONER

## 2023-04-18 PROCEDURE — 99214 OFFICE O/P EST MOD 30 MIN: CPT | Performed by: NURSE PRACTITIONER

## 2023-04-18 RX ORDER — DEXAMETHASONE SODIUM PHOSPHATE 10 MG/ML
10 INJECTION INTRAMUSCULAR; INTRAVENOUS ONCE
Status: COMPLETED | OUTPATIENT
Start: 2023-04-18 | End: 2023-04-18

## 2023-04-18 RX ORDER — AMOXICILLIN 500 MG/1
500 CAPSULE ORAL 2 TIMES DAILY
Qty: 20 CAPSULE | Refills: 0 | Status: SHIPPED | OUTPATIENT
Start: 2023-04-18 | End: 2023-04-28

## 2023-04-18 RX ADMIN — DEXAMETHASONE SODIUM PHOSPHATE 10 MG: 10 INJECTION INTRAMUSCULAR; INTRAVENOUS at 18:48

## 2023-04-18 ASSESSMENT — ENCOUNTER SYMPTOMS
COUGH: 0
EYE PAIN: 0
SHORTNESS OF BREATH: 0
SWOLLEN GLANDS: 1
DIZZINESS: 0
CHILLS: 0
MYALGIAS: 0
FEVER: 1
HEADACHES: 1
SORE THROAT: 1
VOMITING: 0
HOARSE VOICE: 0
NAUSEA: 1
TROUBLE SWALLOWING: 1

## 2023-04-18 NOTE — LETTER
April 18, 2023         Patient: Mariia Wills   YOB: 1995   Date of Visit: 4/18/2023           To Whom it May Concern:    Mariia Wills was seen in my clinic on 4/18/2023. She may return to work on 4/20/23.    If you have any questions or concerns, please don't hesitate to call.        Sincerely,           CEE Manning.  Electronically Signed

## 2023-04-19 NOTE — PROGRESS NOTES
Subjective:   Mariia Wills is a 27 y.o. female who presents for Sore Throat (X 2 days with chills, fever, R ear pain, nausea, headache. )      Pharyngitis   This is a new problem. Episode onset: 2 days; was seen 2 weeks ago strep negative. The problem has been gradually worsening. The fever has been present for Less than 1 day. The pain is at a severity of 10/10. The pain is severe. Associated symptoms include ear pain, headaches, swollen glands and trouble swallowing. Pertinent negatives include no congestion, coughing, ear discharge, hoarse voice, shortness of breath or vomiting. She has had no exposure to strep or mono. She has tried acetaminophen for the symptoms. The treatment provided no relief.     Review of Systems   Constitutional:  Positive for fever and malaise/fatigue. Negative for chills.   HENT:  Positive for ear pain, sore throat and trouble swallowing. Negative for congestion, ear discharge and hoarse voice.    Eyes:  Negative for pain.   Respiratory:  Negative for cough and shortness of breath.    Cardiovascular:  Negative for chest pain.   Gastrointestinal:  Positive for nausea. Negative for vomiting.   Genitourinary:  Negative for hematuria.   Musculoskeletal:  Negative for myalgias.   Skin:  Negative for rash.   Neurological:  Positive for headaches. Negative for dizziness.     Medications:    amoxicillin Caps  FISH OIL PO  loratadine Tabs  PROBIOTIC PO    Allergies: Food    Problem List: Mariia Wills does not have any pertinent problems on file.    Surgical History:  No past surgical history on file.    Past Social Hx: Mariia Wills  reports that she has never smoked. She has never used smokeless tobacco. She reports current alcohol use. She reports current drug use. Drug: Marijuana.     Past Family Hx:  Mariia Wills family history includes Cancer in her maternal grandmother and paternal grandmother; Heart Disease in her paternal grandfather; Lung Disease in her maternal  "grandfather; No Known Problems in her father and mother; Other in her sister.     Problem list, medications, and allergies reviewed by myself today in Epic.     Objective:     BP 98/56   Pulse (!) 104   Temp 37.2 °C (99 °F) (Temporal)   Resp 14   Ht 1.6 m (5' 3\")   Wt 63 kg (139 lb)   LMP 04/03/2023   SpO2 100%   BMI 24.62 kg/m²     Physical Exam  Vitals and nursing note reviewed.   Constitutional:       General: She is not in acute distress.     Appearance: She is well-developed.   HENT:      Head: Normocephalic and atraumatic.      Right Ear: Tympanic membrane and external ear normal.      Left Ear: Tympanic membrane and external ear normal.      Nose: Nose normal.      Right Sinus: No maxillary sinus tenderness or frontal sinus tenderness.      Left Sinus: No maxillary sinus tenderness or frontal sinus tenderness.      Mouth/Throat:      Mouth: Mucous membranes are moist.      Pharynx: Uvula midline. Pharyngeal swelling and posterior oropharyngeal erythema present.      Tonsils: No tonsillar exudate or tonsillar abscesses. 2+ on the right. 2+ on the left.   Eyes:      General:         Right eye: No discharge.         Left eye: No discharge.      Conjunctiva/sclera: Conjunctivae normal.   Cardiovascular:      Rate and Rhythm: Normal rate.   Pulmonary:      Effort: Pulmonary effort is normal. No respiratory distress.      Breath sounds: Normal breath sounds.   Abdominal:      General: There is no distension.   Musculoskeletal:         General: Normal range of motion.   Lymphadenopathy:      Head:      Right side of head: Tonsillar adenopathy present.      Left side of head: Tonsillar adenopathy present.   Skin:     General: Skin is warm and dry.   Neurological:      General: No focal deficit present.      Mental Status: She is alert and oriented to person, place, and time. Mental status is at baseline.      Gait: Gait (gait at baseline) normal.   Psychiatric:         Judgment: Judgment normal. "       Assessment/Plan:     Diagnosis and associated orders:     1. Pharyngitis, unspecified etiology  POCT GROUP A STREP, PCR    POCT Mononucleosis (mono)    dexamethasone (DECADRON) injection (check route below) 10 mg    CULTURE THROAT    amoxicillin (AMOXIL) 500 MG Cap      2. Tonsillitis             Comments/MDM:     Mono negative    Strep negative; patient will be treated empirically with antibiotics as I am concerned of infectious etiology recurring throat infection adenopathy febrile tachycardic  Advised to continue supportive care with Tylenol and/or ibuprofen for fevers and discomfort. Increased fluids and electrolytes.   Differential diagnosis, natural history, supportive care, and indications for immediate follow-up discussed.              Please note that this dictation was created using voice recognition software. I have made a reasonable attempt to correct obvious errors, but I expect that there are errors of grammar and possibly content that I did not discover before finalizing the note.    This note was electronically signed by Jules SÁNCHEZ.

## 2023-04-20 LAB
BACTERIA SPEC RESP CULT: ABNORMAL
BACTERIA SPEC RESP CULT: ABNORMAL
SIGNIFICANT IND 70042: ABNORMAL
SITE SITE: ABNORMAL
SOURCE SOURCE: ABNORMAL

## 2024-01-13 ENCOUNTER — OFFICE VISIT (OUTPATIENT)
Dept: URGENT CARE | Facility: PHYSICIAN GROUP | Age: 29
End: 2024-01-13
Payer: COMMERCIAL

## 2024-01-13 VITALS
TEMPERATURE: 98.2 F | DIASTOLIC BLOOD PRESSURE: 68 MMHG | OXYGEN SATURATION: 97 % | RESPIRATION RATE: 16 BRPM | SYSTOLIC BLOOD PRESSURE: 106 MMHG | HEART RATE: 81 BPM | WEIGHT: 150 LBS | HEIGHT: 67 IN | BODY MASS INDEX: 23.54 KG/M2

## 2024-01-13 DIAGNOSIS — T14.8XXA WOUND OF SKIN: ICD-10-CM

## 2024-01-13 PROCEDURE — 3074F SYST BP LT 130 MM HG: CPT | Performed by: FAMILY MEDICINE

## 2024-01-13 PROCEDURE — 3078F DIAST BP <80 MM HG: CPT | Performed by: FAMILY MEDICINE

## 2024-01-13 PROCEDURE — 90471 IMMUNIZATION ADMIN: CPT | Performed by: FAMILY MEDICINE

## 2024-01-13 PROCEDURE — 90715 TDAP VACCINE 7 YRS/> IM: CPT | Performed by: FAMILY MEDICINE

## 2024-01-13 PROCEDURE — 99213 OFFICE O/P EST LOW 20 MIN: CPT | Mod: 25 | Performed by: FAMILY MEDICINE

## 2024-01-13 RX ORDER — NORETHINDRONE 0.35 MG/1
1 TABLET ORAL
COMMUNITY
Start: 2023-12-08

## 2024-01-14 NOTE — PROGRESS NOTES
"Subjective     Mariia Wills is a 28 y.o. female who presents with Laceration (Left Thumb lac, on glass cup, stings /X 1 day )      - This is a very pleasant 28 y.o. who has come to the walk-in clinic today for cut on left thumb.  Last night opened a cabinet and the glass fell out and thinks maybe tried to catch it and it broke and cut left thumb.  Thinks last tetanus booster was was close to 10 years ago.  Came in to have the wound evaluated today.          ALLERGIES:  Food     PMH:  History reviewed. No pertinent past medical history.     PSH:  History reviewed. No pertinent surgical history.    MEDS:    Current Outpatient Medications:     MIRYAM 0.35 MG tablet, Take 1 Tablet by mouth every day., Disp: , Rfl:     mupirocin (BACTROBAN) 2 % Ointment, Apply 1 Application topically 2 times a day., Disp: 22 g, Rfl: 0    Probiotic Product (PROBIOTIC PO), Take 1 Each by mouth every day., Disp: , Rfl:     loratadine (CLARITIN) 10 MG Tab, Take 10 mg by mouth every day., Disp: , Rfl:     Omega-3 Fatty Acids (FISH OIL PO), Take 1 Each by mouth every day. (Patient not taking: Reported on 4/18/2023), Disp: , Rfl:     ** I have documented what I find to be significant in regards to past medical, social, family and surgical history  in my HPI or under PMH/PSH/FH review section, otherwise it is noncontributory **         HPI    Review of Systems   Skin:         Thumb lac   All other systems reviewed and are negative.             Objective     /68   Pulse 81   Temp 36.8 °C (98.2 °F) (Temporal)   Resp 16   Ht 1.702 m (5' 7\")   Wt 68 kg (150 lb)   SpO2 97%   BMI 23.49 kg/m²      Physical Exam  Vitals and nursing note reviewed.   Constitutional:       General: She is not in acute distress.     Appearance: Normal appearance. She is well-developed.   HENT:      Head: Normocephalic.   Pulmonary:      Effort: Pulmonary effort is normal. No respiratory distress.   Musculoskeletal:        Hands:       Comments: Lt Thumb: " ~0.8cm lac over mcp joint. Full srom. NVI   Neurological:      Mental Status: She is alert.      Motor: No abnormal muscle tone.   Psychiatric:         Mood and Affect: Mood normal.         Behavior: Behavior normal.                             Assessment & Plan     1. Wound of skin  mupirocin (BACTROBAN) 2 % Ointment    Tdap =>8yo IM          - Dx, plan & d/c instructions discussed   - wound cleaned dressed   - Rest thumb and keep wound clean, dry  - dressing change 1-2x/day  - follow up as needed    Follow up with your regular primary care providers office within a week to keep them updated and informed of this visit and for regular routine health maintenance check-ups. ER if not improving in 2-3 days or if feeling/getting worse. (If you do not have a primary care provider and need to schedule one you may call [facility] at [phone] to do this).    Patient left in stable condition         Pertinent prior lab work and/or imaging studies in Epic have been reviewed by me today on day of this visit and taken into account for my treatment and plan today    Pertinent PMH/PSH and/or chronic conditions and medications if any were reviewed today and taken into account for my treatment and plan today    Pertinent prior office visit notes in [facility] have been reviewed by me today on day of this visit.    Please note that this dictation may have been created using voice recognition software, if so I have made every reasonable attempt to correct obvious errors, but I expect that there are errors of grammar and possibly content that I did not discover before finalizing the note.

## 2024-03-14 ENCOUNTER — APPOINTMENT (OUTPATIENT)
Dept: RADIOLOGY | Facility: MEDICAL CENTER | Age: 29
End: 2024-03-14
Attending: EMERGENCY MEDICINE
Payer: COMMERCIAL

## 2024-03-14 ENCOUNTER — HOSPITAL ENCOUNTER (EMERGENCY)
Facility: MEDICAL CENTER | Age: 29
End: 2024-03-14
Attending: EMERGENCY MEDICINE
Payer: COMMERCIAL

## 2024-03-14 VITALS
DIASTOLIC BLOOD PRESSURE: 103 MMHG | HEIGHT: 67 IN | SYSTOLIC BLOOD PRESSURE: 139 MMHG | HEART RATE: 73 BPM | RESPIRATION RATE: 16 BRPM | OXYGEN SATURATION: 97 % | WEIGHT: 146.61 LBS | TEMPERATURE: 97.8 F | BODY MASS INDEX: 23.01 KG/M2

## 2024-03-14 DIAGNOSIS — N94.6 DYSMENORRHEA: ICD-10-CM

## 2024-03-14 LAB
ALBUMIN SERPL BCP-MCNC: 4.6 G/DL (ref 3.2–4.9)
ALBUMIN/GLOB SERPL: 1.4 G/DL
ALP SERPL-CCNC: 56 U/L (ref 30–99)
ALT SERPL-CCNC: 11 U/L (ref 2–50)
ANION GAP SERPL CALC-SCNC: 11 MMOL/L (ref 7–16)
AST SERPL-CCNC: 15 U/L (ref 12–45)
BASOPHILS # BLD AUTO: 1 % (ref 0–1.8)
BASOPHILS # BLD: 0.1 K/UL (ref 0–0.12)
BILIRUB SERPL-MCNC: 0.2 MG/DL (ref 0.1–1.5)
BUN SERPL-MCNC: 12 MG/DL (ref 8–22)
CALCIUM ALBUM COR SERPL-MCNC: 8.7 MG/DL (ref 8.5–10.5)
CALCIUM SERPL-MCNC: 9.2 MG/DL (ref 8.4–10.2)
CHLORIDE SERPL-SCNC: 101 MMOL/L (ref 96–112)
CO2 SERPL-SCNC: 25 MMOL/L (ref 20–33)
CREAT SERPL-MCNC: 0.84 MG/DL (ref 0.5–1.4)
EOSINOPHIL # BLD AUTO: 1.22 K/UL (ref 0–0.51)
EOSINOPHIL NFR BLD: 12 % (ref 0–6.9)
ERYTHROCYTE [DISTWIDTH] IN BLOOD BY AUTOMATED COUNT: 43.8 FL (ref 35.9–50)
GFR SERPLBLD CREATININE-BSD FMLA CKD-EPI: 96 ML/MIN/1.73 M 2
GLOBULIN SER CALC-MCNC: 3.4 G/DL (ref 1.9–3.5)
GLUCOSE SERPL-MCNC: 95 MG/DL (ref 65–99)
HCG SERPL QL: NEGATIVE
HCT VFR BLD AUTO: 36.9 % (ref 37–47)
HGB BLD-MCNC: 11.8 G/DL (ref 12–16)
IMM GRANULOCYTES # BLD AUTO: 0.04 K/UL (ref 0–0.11)
IMM GRANULOCYTES NFR BLD AUTO: 0.4 % (ref 0–0.9)
LIPASE SERPL-CCNC: 28 U/L (ref 11–82)
LYMPHOCYTES # BLD AUTO: 3.43 K/UL (ref 1–4.8)
LYMPHOCYTES NFR BLD: 33.6 % (ref 22–41)
MCH RBC QN AUTO: 26.2 PG (ref 27–33)
MCHC RBC AUTO-ENTMCNC: 32 G/DL (ref 32.2–35.5)
MCV RBC AUTO: 81.8 FL (ref 81.4–97.8)
MONOCYTES # BLD AUTO: 0.69 K/UL (ref 0–0.85)
MONOCYTES NFR BLD AUTO: 6.8 % (ref 0–13.4)
NEUTROPHILS # BLD AUTO: 4.72 K/UL (ref 1.82–7.42)
NEUTROPHILS NFR BLD: 46.2 % (ref 44–72)
NRBC # BLD AUTO: 0 K/UL
NRBC BLD-RTO: 0 /100 WBC (ref 0–0.2)
PLATELET # BLD AUTO: 361 K/UL (ref 164–446)
PMV BLD AUTO: 9 FL (ref 9–12.9)
POTASSIUM SERPL-SCNC: 3.4 MMOL/L (ref 3.6–5.5)
PROT SERPL-MCNC: 8 G/DL (ref 6–8.2)
RBC # BLD AUTO: 4.51 M/UL (ref 4.2–5.4)
SODIUM SERPL-SCNC: 137 MMOL/L (ref 135–145)
WBC # BLD AUTO: 10.2 K/UL (ref 4.8–10.8)

## 2024-03-14 PROCEDURE — 85025 COMPLETE CBC W/AUTO DIFF WBC: CPT

## 2024-03-14 PROCEDURE — 80053 COMPREHEN METABOLIC PANEL: CPT

## 2024-03-14 PROCEDURE — 83690 ASSAY OF LIPASE: CPT

## 2024-03-14 PROCEDURE — 99284 EMERGENCY DEPT VISIT MOD MDM: CPT

## 2024-03-14 PROCEDURE — 76830 TRANSVAGINAL US NON-OB: CPT

## 2024-03-14 PROCEDURE — 84703 CHORIONIC GONADOTROPIN ASSAY: CPT

## 2024-03-14 PROCEDURE — 36415 COLL VENOUS BLD VENIPUNCTURE: CPT

## 2024-03-15 NOTE — ED PROVIDER NOTES
ED Provider Note    CHIEF COMPLAINT  Chief Complaint   Patient presents with    Abdominal Pain     Acute onset of severe suprapubic cramping pain  Associated with nausea and diaph  No emesis No diarrhea LNBM today  Denies UTI s/s Started menses yesterday and on BCP  No fever or chills  Hx ovarian cyst         EXTERNAL RECORDS REVIEWED  Outpatient Notes patient has a history of migraine headaches    HPI/ROS  LIMITATION TO HISTORY   Select: : None  OUTSIDE HISTORIAN(S):  giovanna    Mariia Wills is a 28 y.o. female who presents to the emergency department chief complaint of suprapubic discomfort.  The patient states that she normally gets some pretty bad cramping when she starts her menses but usually last only for few minutes to the results and some large clots being passed and then improves.  She states this evening she had similar cramping but it was severe in nature radiated to her back and lasted for more than 20 minutes.  She states she took some ibuprofen which have kicked in and she is now feeling significantly improved.  She states she did not pass any clots she had some light brown discharge which she believes is just the early part of her period.  She states that when she was in [place] a few years ago she had some severe pain over her left ovary that lasted for a week and this reminded her of it and she never really got it looked into and it made her concerned.  She does have a history of ovarian cyst.  She states her pain is improved she had some nausea earlier but no vomiting no fevers chills she has been compliant with her birth control and is believes she is not likely pregnant.    PAST MEDICAL HISTORY   has a past medical history of Patient denies medical problems.    SURGICAL HISTORY   has a past surgical history that includes no pertinent past surgical history.    FAMILY HISTORY  Family History   Problem Relation Age of Onset    No Known Problems Mother     No Known Problems Father     Heart Disease  "Paternal Grandfather     Cancer Maternal Grandmother         Colon cancer    Lung Disease Maternal Grandfather     Cancer Paternal Grandmother         Breast cancer    Other Sister         migraine headaches       SOCIAL HISTORY  Social History     Tobacco Use    Smoking status: Never    Smokeless tobacco: Never    Tobacco comments:     5 cigarettes entire life   Vaping Use    Vaping Use: Never used   Substance and Sexual Activity    Alcohol use: Yes     Alcohol/week: 0.0 oz     Comment: once every two weeks (4-5 glasses of wine per time)    Drug use: Yes     Types: Marijuana, Inhaled, Oral    Sexual activity: Yes     Partners: Male     Birth control/protection: I.U.D.       CURRENT MEDICATIONS  Home Medications    **Home medications have not yet been reviewed for this encounter**         ALLERGIES  Allergies   Allergen Reactions    Food      Melons and bananas, rash       PHYSICAL EXAM  VITAL SIGNS: BP (!) 139/103   Pulse 82   Temp 36.6 °C (97.8 °F) (Temporal)   Resp 16   Ht 1.702 m (5' 7\")   Wt 66.5 kg (146 lb 9.7 oz)   LMP 03/13/2024   SpO2 99%   Breastfeeding No   BMI 22.96 kg/m²    Pulse OX: Pulse Oxygen level is within normal limits  Constitutional: Alert in no apparent distress.  HENT: Normocephalic, Atraumatic, MMM  Eyes: PERound. Conjunctiva normal, non-icteric.   Heart: Regular rate and rhythm, intact distal pulses   Lungs: Symmetrical movement, no resp distress   Abdomen: Non-tender, non-distended, normal bowel sounds  EXT/Back no edema  Skin: Warm, Dry, No erythema, No rash.   Neurologic: Alert and oriented, Grossly non-focal.       DIAGNOSTIC STUDIES / PROCEDURES      LABS  Labs Reviewed   CBC WITH DIFFERENTIAL - Abnormal; Notable for the following components:       Result Value    Hemoglobin 11.8 (*)     Hematocrit 36.9 (*)     MCH 26.2 (*)     MCHC 32.0 (*)     Eosinophils 12.00 (*)     Eos (Absolute) 1.22 (*)     All other components within normal limits   COMP METABOLIC PANEL - Abnormal; " Notable for the following components:    Potassium 3.4 (*)     All other components within normal limits   LIPASE   HCG QUAL SERUM   ESTIMATED GFR         RADIOLOGY  I have independently interpreted the diagnostic imaging associated with this visit and am waiting the final reading from the radiologist.   My preliminary interpretation is as follows:     US pelvis  - no free fluid, bilateral ovarian blood flow with normal follicular cyst on the left ovary consistent with her current menses    Radiologist interpretation:     US-PELVIC COMPLETE (TRANSABDOMINAL/TRANSVAGINAL) (COMBO)   Final Result         1.  Normal transvaginal appearance of the pelvis.            COURSE & MEDICAL DECISION MAKING    ED Observation Status? No; Patient does not meet criteria for ED Observation.     INITIAL ASSESSMENT, COURSE AND PLAN  Care Narrative:     Patient is a 28-year-old female presents emerged part with abdominal pain after starting her menses with cramping-like nature rating to her back.  She states it felt similar to pain she had ovarian pain a few years ago but it is feeling improved with the ibuprofen.  She is not peritoneal my examination but differential does not exclude ovarian cyst or ovarian torsion although briefly.  Could also just be severe menstrual cramps.  She has been pooping without difficulty and the fact she is not peritoneal is a good sign is unlikely a bacterial infection in the abdomen such as appendicitis.  The patient denies that she is having dysuria or concern for UTI and she is actively having vaginal bleeding due to her.  So we canceled the urinalysis.  She does not request anything for pain at this time but ultrasound of the pelvis was ordered.    DISPOSITION AND DISCUSSIONS    9:31 PM  I reassessed patient at bedside she is feeling improved she feels comfortable going home.  Was awaiting the official results but there is nothing acute that I can see on the ultrasound imaging.  We discussed his  painful cramps in the setting of her menses warm compresses NSAIDs as needed and return precautions for any new worsening issues.  She understands feels comfortable going home        I have discussed management of the patient with the following physicians and EVELIN's:  none    Discussion of management with other QHP or appropriate source(s): None     Escalation of care considered, and ultimately not performed:acute inpatient care management, however at this time, the patient is most appropriate for outpatient management    Barriers to care at this time, including but not limited to:  na .     Decision tools and prescription drugs considered including, but not limited to:  nsaids as needed .    The patient will return for new or worsening symptoms and is stable at the time of discharge.    The patient is referred to a primary physician for blood pressure management, diabetic screening, and for all other preventative health concerns.    DISPOSITION:  Patient will be discharged home in stable condition.    FOLLOW UP:  [facility], Emergency Dept  [address]  [place] [address]  [phone]    If symptoms worsen      OUTPATIENT MEDICATIONS:  New Prescriptions    No medications on file           FINAL DIAGNOSIS  1. Dysmenorrhea           Electronically signed by: Kerrie Gee M.D., 3/14/2024 7:15 PM

## 2025-02-27 ENCOUNTER — OFFICE VISIT (OUTPATIENT)
Dept: URGENT CARE | Facility: CLINIC | Age: 30
End: 2025-02-27
Payer: COMMERCIAL

## 2025-02-27 ENCOUNTER — RESULTS FOLLOW-UP (OUTPATIENT)
Dept: URGENT CARE | Facility: CLINIC | Age: 30
End: 2025-02-27

## 2025-02-27 ENCOUNTER — HOSPITAL ENCOUNTER (OUTPATIENT)
Facility: MEDICAL CENTER | Age: 30
End: 2025-02-27
Attending: PHYSICIAN ASSISTANT
Payer: COMMERCIAL

## 2025-02-27 VITALS
OXYGEN SATURATION: 96 % | HEART RATE: 76 BPM | TEMPERATURE: 99 F | RESPIRATION RATE: 18 BRPM | HEIGHT: 67 IN | DIASTOLIC BLOOD PRESSURE: 52 MMHG | BODY MASS INDEX: 24.48 KG/M2 | WEIGHT: 156 LBS | SYSTOLIC BLOOD PRESSURE: 90 MMHG

## 2025-02-27 DIAGNOSIS — R09.81 COMPLAINT OF NASAL CONGESTION: ICD-10-CM

## 2025-02-27 DIAGNOSIS — J02.9 SORE THROAT: ICD-10-CM

## 2025-02-27 LAB — S PYO DNA SPEC NAA+PROBE: NOT DETECTED

## 2025-02-27 PROCEDURE — 87070 CULTURE OTHR SPECIMN AEROBIC: CPT

## 2025-02-27 RX ORDER — ESCITALOPRAM OXALATE 10 MG/1
10 TABLET ORAL DAILY
COMMUNITY

## 2025-02-27 RX ORDER — AMOXICILLIN 500 MG/1
500 CAPSULE ORAL 2 TIMES DAILY
Qty: 20 CAPSULE | Refills: 0 | Status: SHIPPED | OUTPATIENT
Start: 2025-02-27 | End: 2025-03-09

## 2025-02-27 ASSESSMENT — FIBROSIS 4 INDEX: FIB4 SCORE: 0.38

## 2025-02-27 NOTE — PROGRESS NOTES
Subjective:     Verbal consent was acquired by the patient to use [employer] ambient listening note generation during this visit     Mariia Wills is a 29 y.o. female who presents for Pharyngitis (Hurts to swallow, nasal congestion, body aches and fatigue, X 9 days )       History of Present Illness  The patient presents for evaluation of a sore throat.    She reports experiencing a sore, swollen, and erythematous throat, which was particularly painful upon awakening and during swallowing this morning. However, the discomfort has since subsided. The onset of the sore throat was sudden, occurring overnight. She also mentions the presence of green nasal discharge, but does not report any cough or chest mucus. She has been experiencing fatigue and nasal congestion for the past 9 days, with a brief period of relief on Tuesday. She recalls an episode of body aches and severe nasal congestion on Wednesday. Initially, she experienced significant pressure behind her eyes, but this symptom has since resolved. She does not report any sinus pressure. She does not report any ear pain but notes discomfort in her ear when swallowing. She expresses concern about potential strep throat, given her history of the same, although she believes her current symptoms are less severe. She does not suspect COVID-19 or RSV infection. She requests a work note as she has recently started a new job and wishes to avoid spreading any potential infection. She has a history of strep throat, with a previous negative test result that later turned positive after 3 days.            Medications:  Herminia Tabs  escitalopram Tabs  FISH OIL PO  loratadine Tabs  mupirocin Oint  PROBIOTIC PO    Allergies:             Food    Past Social Hx:  Mariia Wills  reports that she has never smoked. She has never used smokeless tobacco. She reports current alcohol use. She reports current drug use. Drugs: Marijuana, Inhaled, and Oral.           Problem list,  "medications, and allergies reviewed by myself today in Epic.     Objective:     BP 90/52 (BP Location: Left arm, Patient Position: Sitting, BP Cuff Size: Adult)   Pulse 76   Temp 37.2 °C (99 °F) (Temporal)   Resp 18   Ht 1.702 m (5' 7\")   Wt 70.8 kg (156 lb)   SpO2 96%   BMI 24.43 kg/m²     Physical Exam  Vitals and nursing note reviewed.   Constitutional:       General: She is not in acute distress.     Appearance: Normal appearance. She is well-developed. She is ill-appearing. She is not toxic-appearing or diaphoretic.   HENT:      Head: Normocephalic.      Right Ear: Tympanic membrane and external ear normal.      Left Ear: Tympanic membrane and external ear normal.      Nose: Nose normal.      Mouth/Throat:      Lips: Pink.      Mouth: Mucous membranes are moist. No oral lesions or angioedema.      Palate: No mass and lesions.      Pharynx: Uvula midline. Oropharyngeal exudate and posterior oropharyngeal erythema present. No pharyngeal swelling or uvula swelling.      Tonsils: Tonsillar exudate present. No tonsillar abscesses. 2+ on the right. 2+ on the left.      Comments: Tonsillar erythema with trace hypertrophy and mild tonsillar exudate  No evidence of peritonsillar abscess  Voice non-muffled  Uvula midline  Normal phonation  Eyes:      Conjunctiva/sclera: Conjunctivae normal.      Pupils: Pupils are equal, round, and reactive to light.   Cardiovascular:      Rate and Rhythm: Normal rate and regular rhythm.      Pulses: Normal pulses.      Heart sounds: Normal heart sounds. No murmur heard.  Pulmonary:      Effort: Pulmonary effort is normal. No tachypnea, accessory muscle usage or respiratory distress.      Breath sounds: Normal breath sounds. No stridor. No decreased breath sounds, wheezing, rhonchi or rales.      Comments: Lungs are clear to auscultation bilaterally without rhonchi rales or wheezes  Abdominal:      Tenderness: There is no abdominal tenderness.   Musculoskeletal:         General: " Normal range of motion.      Cervical back: Normal range of motion and neck supple. No rigidity.   Lymphadenopathy:      Cervical: Cervical adenopathy present.   Skin:     General: Skin is warm and dry.      Findings: No rash.   Neurological:      Mental Status: She is alert and oriented to person, place, and time.   Psychiatric:         Behavior: Behavior is cooperative.           Strep PCR negative      Assessment/Plan:     Diagnosis and Associated Orders:     1. Sore throat  - POCT CEPHEID GROUP A STREP - PCR  - amoxicillin (AMOXIL) 500 MG Cap; Take 1 Capsule by mouth 2 times a day for 10 days.  Dispense: 20 Capsule; Refill: 0  - CULTURE THROAT; Future    2. Complaint of nasal congestion    Other orders  - escitalopram (LEXAPRO) 10 MG Tab; Take 10 mg by mouth every day.        Medical Decision Making:    Kain 29 y.o. presents to clinic with:    Assessment & Plan  1. Pharyngitis.  She presents with a sore, swollen, and erythematous throat, accompanied by tender lymph nodes and body aches. These symptoms suggest a potential diagnosis of bacterial pharyngitis. She has been experiencing fatigue and nasal congestion for the past 9 days, with a brief period of relief on Tuesday. She recalls an episode of body aches and severe nasal congestion on Wednesday. Initially, she experienced significant pressure behind her eyes, but this symptom has since resolved. She does not report any sinus pressure. She does not report any ear pain but notes discomfort in her ear when swallowing. She expresses concern about potential strep throat, given her history of the same, although she believes her current symptoms are less severe. She does not suspect COVID-19 or RSV infection. She requests a work note as she has recently started a new job and wishes to avoid spreading any potential infection. A prescription for amoxicillin has been sent to [facility]. A throat swab will be obtained for culture. She is advised to perform salt water  gargles, take Tylenol or ibuprofen for pain management, and discard her toothbrush after 2 to 3 days to prevent reinfection. She is also advised to consider herself contagious until she has been on antibiotics for 24 hours. A work note has been provided. If the culture results are negative, the antibiotic course will be discontinued. If she experiences severe pain during swallowing, a one-time dose of dexamethasone can be administered. If her symptoms worsen, she should seek reevaluation.    Addendum: Strep PCR negative.  Will send sample for culture.  Do recommend amoxicillin given history of group C strep  I personally reviewed prior external notes and test results pertinent to today's visit. Patient is clinically stable at today's urgent care visit.  Patient appears nontoxic with no acute distress noted. Appropriate for outpatient care at this time.  Return to clinic or go to ED if symptoms worsen or persist.  Red flag symptoms discussed.  Patient/Parent/Guardian voices understanding.   All side effects of medication discussed including allergic response, GI upset, tendon injury, rash, sedation etc    Please note that this dictation was created using voice recognition software. I have made a reasonable attempt to correct obvious errors, but I expect that there are errors of grammar and possibly content that I did not discover before finalizing the note.    This note was electronically signed by Leticia Cohen PA-C

## 2025-02-27 NOTE — LETTER
February 27, 2025    To Whom It May Concern:         This is confirmation that Mariia Wills attended her scheduled appointment with Leticia Cohen P.A.-C. on 2/27/25.  Please excuse patient from work today and tomorrow.         If you have any questions please do not hesitate to call me at the phone number listed below.    Sincerely,          Leticia Cohen P.A.-C.  [phone]

## 2025-02-28 LAB
BACTERIA SPEC RESP CULT: NORMAL
SIGNIFICANT IND 70042: NORMAL
SITE SITE: NORMAL
SOURCE SOURCE: NORMAL

## 2025-07-10 ENCOUNTER — APPOINTMENT (OUTPATIENT)
Dept: RADIOLOGY | Facility: IMAGING CENTER | Age: 30
End: 2025-07-10
Payer: COMMERCIAL

## 2025-07-10 ENCOUNTER — HOSPITAL ENCOUNTER (OUTPATIENT)
Facility: MEDICAL CENTER | Age: 30
End: 2025-07-10
Payer: COMMERCIAL

## 2025-07-10 ENCOUNTER — OFFICE VISIT (OUTPATIENT)
Dept: URGENT CARE | Facility: CLINIC | Age: 30
End: 2025-07-10
Payer: COMMERCIAL

## 2025-07-10 VITALS
RESPIRATION RATE: 18 BRPM | WEIGHT: 168.8 LBS | DIASTOLIC BLOOD PRESSURE: 76 MMHG | SYSTOLIC BLOOD PRESSURE: 124 MMHG | HEART RATE: 78 BPM | HEIGHT: 67 IN | OXYGEN SATURATION: 99 % | BODY MASS INDEX: 26.49 KG/M2 | TEMPERATURE: 97.8 F

## 2025-07-10 DIAGNOSIS — K59.00 CONSTIPATION, UNSPECIFIED CONSTIPATION TYPE: ICD-10-CM

## 2025-07-10 DIAGNOSIS — R10.31 RIGHT LOWER QUADRANT ABDOMINAL PAIN: Primary | ICD-10-CM

## 2025-07-10 DIAGNOSIS — R10.31 RIGHT LOWER QUADRANT ABDOMINAL PAIN: ICD-10-CM

## 2025-07-10 LAB
APPEARANCE UR: CLEAR
BILIRUB UR STRIP-MCNC: NEGATIVE MG/DL
COLOR UR AUTO: YELLOW
GLUCOSE UR STRIP.AUTO-MCNC: NEGATIVE MG/DL
KETONES UR STRIP.AUTO-MCNC: NEGATIVE MG/DL
LEUKOCYTE ESTERASE UR QL STRIP.AUTO: NEGATIVE
NITRITE UR QL STRIP.AUTO: NEGATIVE
PH UR STRIP.AUTO: 6.5 [PH] (ref 5–8)
POCT INT CON NEG: NEGATIVE
POCT INT CON POS: POSITIVE
POCT URINE PREGNANCY TEST: NEGATIVE
PROT UR QL STRIP: NEGATIVE MG/DL
RBC UR QL AUTO: NEGATIVE
SP GR UR STRIP.AUTO: 1.01
UROBILINOGEN UR STRIP-MCNC: NORMAL MG/DL

## 2025-07-10 PROCEDURE — 81025 URINE PREGNANCY TEST: CPT

## 2025-07-10 PROCEDURE — 3078F DIAST BP <80 MM HG: CPT

## 2025-07-10 PROCEDURE — 87086 URINE CULTURE/COLONY COUNT: CPT

## 2025-07-10 PROCEDURE — 99214 OFFICE O/P EST MOD 30 MIN: CPT

## 2025-07-10 PROCEDURE — 81002 URINALYSIS NONAUTO W/O SCOPE: CPT

## 2025-07-10 PROCEDURE — 3074F SYST BP LT 130 MM HG: CPT

## 2025-07-10 PROCEDURE — 74018 RADEX ABDOMEN 1 VIEW: CPT | Mod: TC,FY | Performed by: RADIOLOGY

## 2025-07-10 RX ORDER — POLYETHYLENE GLYCOL 3350 17 G/17G
17 POWDER, FOR SOLUTION ORAL DAILY
Qty: 6 EACH | Refills: 0 | Status: SHIPPED | OUTPATIENT
Start: 2025-07-10

## 2025-07-10 RX ORDER — ESCITALOPRAM OXALATE 10 MG/1
10 TABLET ORAL DAILY
COMMUNITY

## 2025-07-10 RX ORDER — DOCUSATE SODIUM 100 MG/1
100 CAPSULE, LIQUID FILLED ORAL 2 TIMES DAILY
Qty: 60 CAPSULE | Refills: 2 | Status: SHIPPED | OUTPATIENT
Start: 2025-07-10

## 2025-07-10 ASSESSMENT — ENCOUNTER SYMPTOMS
SHORTNESS OF BREATH: 0
DIARRHEA: 0
SPUTUM PRODUCTION: 0
EYE REDNESS: 0
FEVER: 0
HEARTBURN: 0
WHEEZING: 0
COUGH: 0
VOMITING: 0
EYE DISCHARGE: 0
DIZZINESS: 0
NERVOUS/ANXIOUS: 0
HEADACHES: 0
ABDOMINAL PAIN: 1
MYALGIAS: 0
CHILLS: 0
CONSTIPATION: 1
NAUSEA: 0
SORE THROAT: 0
DEPRESSION: 0
EYE PAIN: 0
BLOOD IN STOOL: 0
PALPITATIONS: 0
FLANK PAIN: 0
STRIDOR: 0

## 2025-07-10 ASSESSMENT — FIBROSIS 4 INDEX: FIB4 SCORE: 0.38

## 2025-07-10 NOTE — PROGRESS NOTES
"Subjective:   Mariia Wills is a 29 y.o. female who presents for UTI (2 days )          I introduced myself to the patient and informed them that I am a Family Nurse Practitioner.    HPI:Mariia is a 29 year-old female who comes in today c/o R sided abdominal discomfort. Onset was 2 days ago.  Patient describes symptoms as intermittent. They describe the pain as  aching. Aggravating factors include passing urine, though she denies any burning when she passes urine or any urinary urgency or frequency. Relieving factors include lying flat on her back.  Treatments tried at home include took some laxatives a few days ago because she has not had a \"good\" bowel movement in over a week, this just gave her diarrhea. She stes she has been having small BM's daily, but stool is dry and pellet like.They describe their symptoms as moderate.  They deny any vaginal discharge or odor, denies any concern for STIs. They deny fever, chills, nausea or vomiting, flank pain, hematuria. She denies breastfeeding or any chance she could be pregnant currently.         Review of Systems   Constitutional:  Negative for chills, fever and malaise/fatigue.   HENT:  Negative for congestion, ear pain and sore throat.    Eyes:  Negative for pain, discharge and redness.   Respiratory:  Negative for cough, sputum production, shortness of breath, wheezing and stridor.    Cardiovascular:  Negative for chest pain and palpitations.   Gastrointestinal:  Positive for abdominal pain and constipation. Negative for blood in stool, diarrhea, heartburn, melena, nausea and vomiting.   Genitourinary:  Negative for dysuria, flank pain, frequency, hematuria and urgency.   Musculoskeletal:  Negative for myalgias.   Skin:  Negative for rash.   Neurological:  Negative for dizziness and headaches.   Psychiatric/Behavioral:  Negative for depression. The patient is not nervous/anxious.        Medications: Herminia Tabs  escitalopram Tabs  FISH OIL PO  loratadine " "Tabs  mupirocin Oint  PROBIOTIC PO     Allergies: Food    Problem List: does not have any pertinent problems on file.    Surgical History:  Past Surgical History:   Procedure Laterality Date    NO PERTINENT PAST SURGICAL HISTORY         Past Social Hx:   reports that she has never smoked. She has never used smokeless tobacco. She reports current alcohol use. She reports current drug use. Drugs: Marijuana, Inhaled, and Oral.     Past Family Hx:   family history includes Breast Cancer in her paternal grandmother; Colorectal Cancer in her maternal grandmother; Heart Disease in her paternal grandfather; Lung Disease in her maternal grandfather; No Known Problems in her father; Other in her mother and sister.     Problem list, medications, and allergies reviewed by myself today in Epic.   I have documented what I find to be significant in regards to past medical, social, family and surgical history  in my HPI or under PMH/PSH/FH review section, otherwise it is noncontributory     Objective:     /76   Pulse 78   Temp 36.6 °C (97.8 °F) (Temporal)   Resp 18   Ht 1.702 m (5' 7\")   Wt 76.6 kg (168 lb 12.8 oz)   SpO2 99%   BMI 26.44 kg/m²     During this visit, appropriate PPE was worn, and hand hygiene was performed.    Physical Exam  Vitals reviewed.   Constitutional:       General: She is not in acute distress.     Appearance: Normal appearance. She is normal weight. She is not ill-appearing or toxic-appearing.   HENT:      Head: Normocephalic and atraumatic.      Right Ear: External ear normal.      Left Ear: External ear normal.      Nose: No congestion or rhinorrhea.   Eyes:      General: No scleral icterus.        Right eye: No discharge.         Left eye: No discharge.      Extraocular Movements: Extraocular movements intact.      Conjunctiva/sclera: Conjunctivae normal.   Cardiovascular:      Rate and Rhythm: Normal rate.   Pulmonary:      Effort: Pulmonary effort is normal.   Abdominal:      General: " Abdomen is flat. Bowel sounds are normal. There is no distension or abdominal bruit.      Palpations: Abdomen is soft. There is no shifting dullness, hepatomegaly, splenomegaly, mass or pulsatile mass.      Tenderness: There is abdominal tenderness in the right lower quadrant. There is no right CVA tenderness, left CVA tenderness, guarding or rebound. Positive signs include obturator sign. Negative signs include Mcgraw's sign, Rovsing's sign and McBurney's sign.      Hernia: No hernia is present.          Comments: I did have patient stand on tiptoes and fall hard onto their heels, and also stomp both feet on the ground, no peritoneal signs appreciated.     Genitourinary:     Comments: Deferred  Musculoskeletal:         General: No swelling or tenderness. Normal range of motion.      Right lower leg: No edema.      Left lower leg: No edema.   Skin:     General: Skin is warm and dry.   Neurological:      General: No focal deficit present.      Mental Status: She is alert and oriented to person, place, and time. Mental status is at baseline.   Psychiatric:         Mood and Affect: Mood normal.         Behavior: Behavior normal.         RADIOLOGY RESULTS     [id number] VIEW  Result Date: 7/10/2025  7/10/2025 2:36 PM HISTORY/REASON FOR EXAM:  Abdominal Pain. RIGHT lower abdominal pain, constipation. TECHNIQUE/EXAM DESCRIPTION AND NUMBER OF VIEWS:  1 view(s) of the abdomen. COMPARISON: None FINDINGS: No evidence for small bowel obstruction. Mildly increased colonic stool. No gross mass or abnormal calcification. No major bony abnormality is seen.     1.  Mildly increased colonic stool. 2.  No bowel obstruction.          Lab Results/POC Test Results   Results for orders placed or performed in visit on 07/10/25   POCT Urinalysis    Collection Time: 07/10/25  2:22 PM   Result Value Ref Range    POC Color YELLOW Negative    POC Appearance CLEAR Negative    POC Glucose NEGATIVE Negative mg/dL    POC Bilirubin NEGATIVE  Negative mg/dL    POC Ketones NEGATIVE Negative mg/dL    POC Specific Gravity 1.010 <1.005 - >1.030    POC Blood NEGATIVE Negative    POC Urine PH 6.5 5.0 - 8.0    POC Protein NEGATIVE Negative mg/dL    POC Urobiligen 0.2 E. U. /DL Negative (0.2) mg/dL    POC Nitrites NEGATIVE Negative    POC Leukocyte Esterase NEGATIVE Negative   POCT Pregnancy    Collection Time: 07/10/25  2:22 PM   Result Value Ref Range    POC Urine Pregnancy Test Negative     Internal Control Positive Positive     Internal Control Negative Negative            Assessment/Plan:     Diagnosis and associated orders:     1. Right lower quadrant abdominal pain  [id number] VIEW    POCT Urinalysis    POCT Pregnancy    CANCELED: URINE CULTURE(NEW)      2. Constipation, unspecified constipation type  polyethylene glycol/lytes (MIRALAX) 17 g Pack    docusate sodium (COLACE) 100 MG Cap         Comments/MDM:     1. Right lower quadrant abdominal pain (Primary)  Abdominal exam is largely negative other than some very mild right lower quadrant TTP, KUB does show increased stool burden otherwise negative for any acute signs.  UA is negative for any signs of UTI, patient denies any dysuria, urinary urgency or frequency, vaginal discharge, odor, itching.  No abnormal vaginal bleeding or dyspareunia. She denies any fever, chills, nausea or vomiting  Discussed with patient suspected diagnosis of constipation causing her abdominal discomfort, we did discuss possible differential diagnosis including but not limited to appendicitis, ovarian torsion, ovarian cyst, colitis, terminal ileitis  We discussed ER workup versus outpatient workup with imaging  She states she does not feel she needs imaging at this point  Discussed return precautions  Discussed with patient she should have a low threshold for going to the ER if her symptoms worsen   Discussed with patient I will send a urine for culture out of caution, will notify her of the results MyChart  Patient states  she is given standing well instructions and is agreeable with plan of care  - [id number] VIEW; Future  - POCT Urinalysis  - POCT Pregnancy    2. Constipation, unspecified constipation type  Supportive care measures discussed.  Will have patient start on stool softeners twice a day ongoing, MiraLAX daily until she has a good bowel movement.    - polyethylene glycol/lytes (MIRALAX) 17 g Pack; Take 1 Packet by mouth every day.  Dispense: 6 Each; Refill: 0  - docusate sodium (COLACE) 100 MG Cap; Take 1 Capsule by mouth 2 times a day.  Dispense: 60 Capsule; Refill: 2          Pt is clinically stable at today's acute urgent care visit. Vital signs are normal and reassuring.  No acute distress noted. Appropriate for outpatient management at this time.        I personally reviewed prior external notes and test results pertinent to today's visit.  I have independently reviewed and interpreted all diagnostics ordered during this urgent care acute visit.        Please note that this dictation was created using voice recognition software. I have made a reasonable attempt to correct obvious errors, but I expect that there are errors of grammar and possibly content that I did not discover before finalizing the note.    This note was electronically signed by Francisco Javier SÁNCHEZ, GEOVANNA, PELON, ALCIDES

## 2025-07-10 NOTE — LETTER
July 10, 2025    To Whom It May Concern:         This is confirmation that Mariia Wills attended her scheduled appointment with LULI Rodriguez on 7/10/25.    They are medically excused from work from 07/10/2025 through 07/12/2025. They may return to work on 07/13/2025 or sooner if their symptoms are improving.            If you have any questions please do not hesitate to call me at the phone number listed below.    Sincerely,          CEE Rodriguez.  [phone]

## 2025-07-11 ENCOUNTER — APPOINTMENT (OUTPATIENT)
Dept: RADIOLOGY | Facility: MEDICAL CENTER | Age: 30
End: 2025-07-11
Attending: EMERGENCY MEDICINE
Payer: COMMERCIAL

## 2025-07-11 ENCOUNTER — HOSPITAL ENCOUNTER (EMERGENCY)
Facility: MEDICAL CENTER | Age: 30
End: 2025-07-11
Attending: EMERGENCY MEDICINE
Payer: COMMERCIAL

## 2025-07-11 VITALS
BODY MASS INDEX: 25.4 KG/M2 | OXYGEN SATURATION: 100 % | TEMPERATURE: 97.3 F | DIASTOLIC BLOOD PRESSURE: 73 MMHG | SYSTOLIC BLOOD PRESSURE: 122 MMHG | RESPIRATION RATE: 2 BRPM | WEIGHT: 161.82 LBS | HEART RATE: 73 BPM | HEIGHT: 67 IN

## 2025-07-11 DIAGNOSIS — N28.1 COMPLEX RENAL CYST: ICD-10-CM

## 2025-07-11 DIAGNOSIS — N83.201 CYST OF RIGHT OVARY: Primary | ICD-10-CM

## 2025-07-11 LAB
ALBUMIN SERPL BCP-MCNC: 4.4 G/DL (ref 3.2–4.9)
ALBUMIN/GLOB SERPL: 1.4 G/DL
ALP SERPL-CCNC: 56 U/L (ref 30–99)
ALT SERPL-CCNC: 10 U/L (ref 2–50)
ANION GAP SERPL CALC-SCNC: 12 MMOL/L (ref 7–16)
APPEARANCE UR: CLEAR
AST SERPL-CCNC: 17 U/L (ref 12–45)
BACTERIA #/AREA URNS HPF: ABNORMAL /HPF
BASOPHILS # BLD AUTO: 0.9 % (ref 0–1.8)
BASOPHILS # BLD: 0.07 K/UL (ref 0–0.12)
BILIRUB SERPL-MCNC: 0.4 MG/DL (ref 0.1–1.5)
BILIRUB UR QL STRIP.AUTO: NEGATIVE
BUN SERPL-MCNC: 8 MG/DL (ref 8–22)
CALCIUM ALBUM COR SERPL-MCNC: 8.9 MG/DL (ref 8.5–10.5)
CALCIUM SERPL-MCNC: 9.2 MG/DL (ref 8.5–10.5)
CASTS URNS QL MICRO: ABNORMAL /LPF (ref 0–2)
CHLORIDE SERPL-SCNC: 104 MMOL/L (ref 96–112)
CO2 SERPL-SCNC: 22 MMOL/L (ref 20–33)
COLOR UR: YELLOW
CREAT SERPL-MCNC: 0.78 MG/DL (ref 0.5–1.4)
EOSINOPHIL # BLD AUTO: 0.41 K/UL (ref 0–0.51)
EOSINOPHIL NFR BLD: 5.2 % (ref 0–6.9)
EPITHELIAL CELLS 1715: ABNORMAL /HPF (ref 0–5)
ERYTHROCYTE [DISTWIDTH] IN BLOOD BY AUTOMATED COUNT: 41.9 FL (ref 35.9–50)
GFR SERPLBLD CREATININE-BSD FMLA CKD-EPI: 104 ML/MIN/1.73 M 2
GLOBULIN SER CALC-MCNC: 3.2 G/DL (ref 1.9–3.5)
GLUCOSE SERPL-MCNC: 76 MG/DL (ref 65–99)
GLUCOSE UR STRIP.AUTO-MCNC: NEGATIVE MG/DL
HCG SERPL QL: NEGATIVE
HCT VFR BLD AUTO: 42.6 % (ref 37–47)
HGB BLD-MCNC: 13.8 G/DL (ref 12–16)
IMM GRANULOCYTES # BLD AUTO: 0.03 K/UL (ref 0–0.11)
IMM GRANULOCYTES NFR BLD AUTO: 0.4 % (ref 0–0.9)
KETONES UR STRIP.AUTO-MCNC: NEGATIVE MG/DL
LEUKOCYTE ESTERASE UR QL STRIP.AUTO: ABNORMAL
LIPASE SERPL-CCNC: 27 U/L (ref 11–82)
LYMPHOCYTES # BLD AUTO: 1.92 K/UL (ref 1–4.8)
LYMPHOCYTES NFR BLD: 24.4 % (ref 22–41)
MCH RBC QN AUTO: 29.6 PG (ref 27–33)
MCHC RBC AUTO-ENTMCNC: 32.4 G/DL (ref 32.2–35.5)
MCV RBC AUTO: 91.2 FL (ref 81.4–97.8)
MICRO URNS: ABNORMAL
MONOCYTES # BLD AUTO: 0.72 K/UL (ref 0–0.85)
MONOCYTES NFR BLD AUTO: 9.1 % (ref 0–13.4)
NEUTROPHILS # BLD AUTO: 4.72 K/UL (ref 1.82–7.42)
NEUTROPHILS NFR BLD: 60 % (ref 44–72)
NITRITE UR QL STRIP.AUTO: NEGATIVE
NRBC # BLD AUTO: 0 K/UL
NRBC BLD-RTO: 0 /100 WBC (ref 0–0.2)
PH UR STRIP.AUTO: 6.5 [PH] (ref 5–8)
PLATELET # BLD AUTO: 308 K/UL (ref 164–446)
PMV BLD AUTO: 9.3 FL (ref 9–12.9)
POTASSIUM SERPL-SCNC: 3.6 MMOL/L (ref 3.6–5.5)
PROT SERPL-MCNC: 7.6 G/DL (ref 6–8.2)
PROT UR QL STRIP: NEGATIVE MG/DL
RBC # BLD AUTO: 4.67 M/UL (ref 4.2–5.4)
RBC # URNS HPF: ABNORMAL /HPF
RBC UR QL AUTO: NEGATIVE
SODIUM SERPL-SCNC: 138 MMOL/L (ref 135–145)
SP GR UR STRIP.AUTO: 1.01
UROBILINOGEN UR STRIP.AUTO-MCNC: 0.2 EU/DL
WBC # BLD AUTO: 7.9 K/UL (ref 4.8–10.8)
WBC #/AREA URNS HPF: ABNORMAL /HPF

## 2025-07-11 PROCEDURE — 36415 COLL VENOUS BLD VENIPUNCTURE: CPT

## 2025-07-11 PROCEDURE — 80053 COMPREHEN METABOLIC PANEL: CPT

## 2025-07-11 PROCEDURE — 83690 ASSAY OF LIPASE: CPT

## 2025-07-11 PROCEDURE — 85025 COMPLETE CBC W/AUTO DIFF WBC: CPT

## 2025-07-11 PROCEDURE — 81001 URINALYSIS AUTO W/SCOPE: CPT

## 2025-07-11 PROCEDURE — 84703 CHORIONIC GONADOTROPIN ASSAY: CPT

## 2025-07-11 PROCEDURE — 700117 HCHG RX CONTRAST REV CODE 255: Performed by: EMERGENCY MEDICINE

## 2025-07-11 PROCEDURE — 99284 EMERGENCY DEPT VISIT MOD MDM: CPT

## 2025-07-11 PROCEDURE — 74177 CT ABD & PELVIS W/CONTRAST: CPT

## 2025-07-11 RX ORDER — IBUPROFEN 600 MG/1
600 TABLET, FILM COATED ORAL
Qty: 20 TABLET | Refills: 0 | Status: SHIPPED | OUTPATIENT
Start: 2025-07-11

## 2025-07-11 RX ORDER — ONDANSETRON 4 MG/1
4 TABLET, ORALLY DISINTEGRATING ORAL EVERY 8 HOURS PRN
Qty: 12 TABLET | Refills: 0 | Status: SHIPPED | OUTPATIENT
Start: 2025-07-11

## 2025-07-11 RX ADMIN — IOHEXOL 100 ML: 350 INJECTION, SOLUTION INTRAVENOUS at 15:00

## 2025-07-11 ASSESSMENT — FIBROSIS 4 INDEX: FIB4 SCORE: 0.38

## 2025-07-11 NOTE — ED TRIAGE NOTES
"/71   Pulse 73   Temp 36.8 °C (98.2 °F) (Temporal)   Resp 16   Ht 1.702 m (5' 7\")   Wt 73.4 kg (161 lb 13.1 oz)   LMP 06/19/2025 (Exact Date)   SpO2 98%   BMI 25.34 kg/m²   Chief Complaint   Patient presents with    Abdominal Pain     Started 4 days ago  having pain to R mid side area   was seen a UC yesterday and told to f/u w/ pain did not get better   denies N/V  no issues having BM       Comes in w/ sister her for evaluation of  her continued pain to abdomen    "

## 2025-07-11 NOTE — ED NOTES
Orders recvd. Saline lock with blood draw at this time followed by pelvic exam by erp. Urine spec  also obtained and lab. Pt aware of npo and pending ct

## 2025-07-11 NOTE — ED PROVIDER NOTES
ED Provider Note    CHIEF COMPLAINT  Chief Complaint   Patient presents with    Abdominal Pain     Started 4 days ago  having pain to R mid side area   was seen a UC yesterday and told to f/u w/ pain did not get better   denies N/V  no issues having BM         EXTERNAL RECORDS REVIEWED  Outpatient Notes   and Outpatient labs & studies patient seen in the urgent care yesterday, for belly pain.  Had urine which was unremarkable, x-ray which showed some mild constipation pattern.    HPI/ROS  LIMITATION TO HISTORY   None  OUTSIDE HISTORIAN(S):  None    Mariia Wills is a 29 y.o. female who presents abdominal pain.  She has had some pain in the right side of the abdomen for the past 4 days or so.  She does not describe any colicky symptoms at all.  The discomfort.  She has no associated anorexia, and in fact is hungry now no nausea vomiting.  Menstrual cycles been normal, she is about due to start now.  She has had no vaginal discharge.  Denies any stool changes, bowel movement yesterday.  She denies samantha dysuria but points out that yesterday in particular when she was urinated it made the abdomen hurt worse.  Denies any fever or chills.  She presents here because the pain was continuing, and she was concerned she may need a scan of some type.  There is no other complaint.    PAST MEDICAL HISTORY   has a past medical history of Patient denies medical problems.    SURGICAL HISTORY   has a past surgical history that includes no pertinent past surgical history.    FAMILY HISTORY  Family History   Problem Relation Age of Onset    No Known Problems Mother     No Known Problems Father     Heart Disease Paternal Grandfather     Cancer Maternal Grandmother         Colon cancer    Lung Disease Maternal Grandfather     Cancer Paternal Grandmother         Breast cancer    Other Sister         migraine headaches       SOCIAL HISTORY  Social History     Tobacco Use    Smoking status: Never    Smokeless tobacco: Never    Tobacco  "comments:     5 cigarettes entire life   Vaping Use    Vaping status: Never Used   Substance and Sexual Activity    Alcohol use: Yes     Alcohol/week: 0.0 oz     Comment: once every two weeks (4-5 glasses of wine per time)    Drug use: Yes     Types: Marijuana, Inhaled, Oral    Sexual activity: Yes     Partners: Male     Birth control/protection: I.U.D.       CURRENT MEDICATIONS  Home Medications       Reviewed by Myriam Wilson R.N. (Registered Nurse) on 07/11/25 at 1126  Med List Status: Partial     Medication Last Dose Status   docusate sodium (COLACE) 100 MG Cap  Active   MIRYAM 0.35 MG tablet  Active   escitalopram (LEXAPRO) 10 MG Tab  Active   escitalopram (LEXAPRO) 10 MG Tab  Active   loratadine (CLARITIN) 10 MG Tab  Active   mupirocin (BACTROBAN) 2 % Ointment  Active   Omega-3 Fatty Acids (FISH OIL PO)  Active   polyethylene glycol/lytes (MIRALAX) 17 g Pack  Active   Probiotic Product (PROBIOTIC PO)  Active                  Audit from Redirected Encounters    **Home medications have not yet been reviewed for this encounter**         ALLERGIES  Allergies[1]    PHYSICAL EXAM  VITAL SIGNS: /73   Pulse 71   Temp 36.8 °C (98.2 °F) (Temporal)   Resp 16   Ht 1.702 m (5' 7\")   Wt 73.4 kg (161 lb 13.1 oz)   LMP 06/19/2025 (Exact Date)   SpO2 96%   BMI 25.34 kg/m²    Constitutional: Well appearing patient in no acute distress.  HENT: Head is without trauma.  Oropharynx is clear.  Mucous membranes are moist.  Eyes: Sclerae are nonicteric, pupils are equally round.  Neck: Supple with grossly normal range of motion.  Cardiovascular: Heart is regular rate and rhythm without murmur rub or gallop.  Peripheral pulses are intact and symmetric throughout.  Thorax & Lungs: Breathing easily.  Good air movement.  There is no wheeze, rhonchi or rales.  Abdomen: Bowel sounds normal, soft, non-distended, no mass nor pulsatile mass. I do not appreciate hepatosplenomegaly.  There is mild tenderness to the right " lateral aspect of the periumbilical area.  There is no rebound or guarding.  There is no Mcgraw sign.  There is no samantha tenderness over McBurney's.  Gyn: Nurse Lesly as chaperone.  Normal external genitalia.   normal vagina to inspection.  No discharge.  No cervical motion tenderness.  No adnexal mass is appreciated.  No particular adnexal tenderness is appreciated.  Skin: No apparent rash.  I do not see petechiae or purpura.  Extremities: No evidence of acute trauma.    Neurologic: Alert. Moving all extremities.  Intact sensation and strength throughout.  Gait is normal.  Psychiatric: Normal for situation      EKG/LABS  Labs Reviewed   URINALYSIS - Abnormal; Notable for the following components:       Result Value    Leukocyte Esterase Trace (*)     All other components within normal limits   URINE MICROSCOPIC (W/UA) - Abnormal; Notable for the following components:    WBC 3-5 (*)     Bacteria Rare (*)     All other components within normal limits   CBC WITH DIFFERENTIAL   COMP METABOLIC PANEL   LIPASE   HCG QUAL SERUM   ESTIMATED GFR         RADIOLOGY/PROCEDURES   I have independently interpreted the diagnostic imaging associated with this visit and am waiting the final reading from the radiologist.   My preliminary interpretation is as follows: No inflammatory changes    Radiologist interpretation:  CT-ABDOMEN-PELVIS WITH   Final Result      1.  There is a complex cystic lesion in the lower pole of the right kidney.   2.  Probable collapsing right ovarian cyst with free intraperitoneal fluid in the pelvis.   3.  No evidence for bowel obstruction, diverticulitis, or appendicitis.             COURSE & MEDICAL DECISION MAKING    ASSESSMENT, COURSE AND PLAN  Care Narrative: Patient presents with 4 days abdominal pain.  She has some mild tenderness.  She had an x-ray yesterday which showed some mild constipation pattern but she has been having bowel movements.  Given the duration of her symptoms with continued  tenderness on exam, we will go ahead and proceed with a workup.    Laboratories returned showing no leukocytosis.  No shift.  Urinalysis shows a few cells but no evidence of a urinary tract infection.  Basic chemistries and renal function are normal.  No hepatitis or pancreatitis.  She is not pregnant.  CT scan shows the above results.  She does have a complex ascitic lesion in the lower pole of the right kidney.  I do not think this is contributing.  I discussed this with the radiologist, Dr. Maciel, who suspects is a sequela of previous infection or simply a calcification of a previous simple cyst.  He recommends outpatient ultrasound for follow-up.  Other abnormality was felt to be likely collapsing right ovarian cyst with some surrounding free fluid.  No evidence of appendicitis or obstruction.  No cholecystitis.    I discussed the patient's test results with her.  This point we will go ahead and discharge her home.  I have left a message with the  and put a referral in for primary care follow-up; patient used to have a primary care doctor who moved away, and she has not reestablished.  It sounds like she will need an outpatient ultrasound to determine if any further follow-up is necessary for that cyst.  With respect to the resolving ovarian cyst, I suspect that that is etiology of her symptoms.  Will treat this with ibuprofen, Zofran if she needs it for nausea.  I recommended following up with her provider at [facility].  Instructions on ovarian cyst.    DISPOSITION AND DISCUSSIONS  I have discussed management of the patient with the following physicians and EVELIN's:      Discussion of management with other QHP or appropriate source(s):      Escalation of care considered, and ultimately not performed:    Barriers to care at this time, including but not limited to: .     Decision tools and prescription drugs considered including, but not limited to: Ibuprofen and Zofran.    FINAL DIAGNOSIS  1.  Cyst of right ovary    2. Complex renal cyst         Electronically signed by: Noel Boyle M.D., 7/11/2025 3:40 PM           [1]   Allergies  Allergen Reactions    Food       Kiwi's,  bananas, break out in a rash

## 2025-07-11 NOTE — ED NOTES
"Amb to room w/o distress. To br prior to erp to attempt urine spec. Aware of npo, states no meal \"only water \" this am  "

## 2025-07-11 NOTE — ED NOTES
Results noted by erp-pt for d/c. Aware of need to  meds at [facility] and take as directed, as well as f/u with  pcp (number provided for same ) to return for worsening s/s

## 2025-07-11 NOTE — DISCHARGE PLANNING
"TCN following. HTH/SCP chart review completed. Note pt currently in ED secondary to abdominal pain.     Note that pt was at  yesterday (7/10) for similar c/o and was instructed \"to go to ER if any worsening abdominal or flank pain\".     Per review, pt remains ambulatory and on RA at this time. At this time anticipating that pt will remain functionally capable of dc to home once medically appropriate (either directly from ED or after admission to [facility] if warranted). Per chart patient does not have a PCP. If ERP recommending hospital/transitional follow up with a PCP*, TCN will attempt to establish PCP as able.    If pt admits to [facility], TCN will continue to monitor and assist with SCP/HTH authorization consideration for post acute needs if indicated. Please reach out to TCN via VOALTE if post acute transitional care needs are warranted for dc planning.     *update: pt has dc'd to home; noted consideration for establishing PCP per ERP; TCN will attempt to schedule establishing PCP appt as able -> 7/15 ([address])   "

## 2025-07-11 NOTE — Clinical Note
Member Name: Mariia Wills   Member Number: [id number]   Reference Number: [id number]   Approved Services: Consultation   Approved Service Dates: 07/11/2025 - 07/11/2026   Requesting Provider: Noel Boyle   Requested Provider: [facility]     Dear Mariia Wills:     The following medical service(s) requested by Noel Boyle have been approved:    Procedure Code Procedure Code Name Requested Quantity Approved Quantity Status   48785 (CPT®) SC OFFICE/OUTPATIENT NEW MODERATE MDM 45 MINUTES 1 1 Authorized   26313 (CPT®) SC OFFICE/OUTPATIENT ESTABLISHED MOD MDM 30 MIN 5 5 Authorized       Approved Quantity means the number of visits approved for medication treatments and/or medical services.    The services should be provided by [facility] no later than 07/11/2026. Please contact the provider listed below with any questions.     Provider Information:  [facility]  [phone]    Your plan benefit may require a deductible, co-payment or coinsurance for these services. This authorization does not guarantee [facility] will pay the claim for services that you receive. Payment by [facility] for these services is subject to the terms of your Evidence of Coverage or Summary Plan Description, your eligibility at the time of service, and confirmation of benefit coverage.    For any questions or additional information, please contact Customer Service:    [facility]  Customer Service: [phone] or toll free [phone]  TTY users dial: 711   Call Center Hours: Mon - Fri 7 AM to 8 PM PST   Office Hours: Mon - Fri 8 AM to 5 PM [facility]   E-mail: [email or web address]   Website: [email or web address]       This information is available for free in other languages. Please contact Customer Service at the phone number above for more information. [facility] complies with applicable Federal civil rights laws and does not discriminate  on the basis of race, color, national origin, age, disability or sex.      Sincerely,     Healthcare Utilization Management Department     Cc: [facility]   Noel Boyle

## 2025-07-11 NOTE — DISCHARGE INSTRUCTIONS
Ibuprofen for pain and inflammation.  Zofran if needed for nausea.  I recommend follow-up with your gynecologist in the next few weeks for recheck.  With regards to that kidney cyst, as discussed, the radiologist recommends an outpatient ultrasound; from there they can determine whether/if you need any follow-up going forward.

## 2025-07-12 ENCOUNTER — TELEPHONE (OUTPATIENT)
Dept: URGENT CARE | Facility: CLINIC | Age: 30
End: 2025-07-12
Payer: COMMERCIAL

## 2025-07-12 NOTE — TELEPHONE ENCOUNTER
Patient's message reviewed.  Telephone call to patient to check on her status, no answer, message left, per chart review I do see that she presented to the ER

## 2025-07-13 LAB
BACTERIA UR CULT: NORMAL
SIGNIFICANT IND 70042: NORMAL
SITE SITE: NORMAL
SOURCE SOURCE: NORMAL

## 2025-07-15 ENCOUNTER — OFFICE VISIT (OUTPATIENT)
Dept: MEDICAL GROUP | Facility: PHYSICIAN GROUP | Age: 30
End: 2025-07-15
Attending: EMERGENCY MEDICINE
Payer: COMMERCIAL

## 2025-07-15 VITALS
HEIGHT: 67 IN | WEIGHT: 170 LBS | SYSTOLIC BLOOD PRESSURE: 114 MMHG | TEMPERATURE: 97 F | HEART RATE: 70 BPM | DIASTOLIC BLOOD PRESSURE: 60 MMHG | OXYGEN SATURATION: 99 % | BODY MASS INDEX: 26.68 KG/M2

## 2025-07-15 DIAGNOSIS — N28.1 RENAL CYST: ICD-10-CM

## 2025-07-15 DIAGNOSIS — G43.009 MIGRAINE WITHOUT AURA AND WITHOUT STATUS MIGRAINOSUS, NOT INTRACTABLE: ICD-10-CM

## 2025-07-15 DIAGNOSIS — N83.209 CYST OF OVARY, UNSPECIFIED LATERALITY: ICD-10-CM

## 2025-07-15 DIAGNOSIS — Z76.89 ENCOUNTER TO ESTABLISH CARE: ICD-10-CM

## 2025-07-15 DIAGNOSIS — F32.A ANXIETY AND DEPRESSION: ICD-10-CM

## 2025-07-15 DIAGNOSIS — F41.9 ANXIETY AND DEPRESSION: ICD-10-CM

## 2025-07-15 PROBLEM — N64.3 GALACTORRHEA: Status: RESOLVED | Noted: 2018-10-10 | Resolved: 2025-07-15

## 2025-07-15 PROCEDURE — 99214 OFFICE O/P EST MOD 30 MIN: CPT | Performed by: STUDENT IN AN ORGANIZED HEALTH CARE EDUCATION/TRAINING PROGRAM

## 2025-07-15 PROCEDURE — 3074F SYST BP LT 130 MM HG: CPT | Performed by: STUDENT IN AN ORGANIZED HEALTH CARE EDUCATION/TRAINING PROGRAM

## 2025-07-15 PROCEDURE — 3078F DIAST BP <80 MM HG: CPT | Performed by: STUDENT IN AN ORGANIZED HEALTH CARE EDUCATION/TRAINING PROGRAM

## 2025-07-15 RX ORDER — SUMATRIPTAN SUCCINATE 100 MG/1
100 TABLET ORAL
Qty: 10 TABLET | Refills: 3 | Status: SHIPPED | OUTPATIENT
Start: 2025-07-15

## 2025-07-15 ASSESSMENT — ENCOUNTER SYMPTOMS
CHILLS: 0
SHORTNESS OF BREATH: 0
FEVER: 0
PALPITATIONS: 0

## 2025-07-15 ASSESSMENT — PATIENT HEALTH QUESTIONNAIRE - PHQ9: CLINICAL INTERPRETATION OF PHQ2 SCORE: 0

## 2025-07-15 ASSESSMENT — FIBROSIS 4 INDEX: FIB4 SCORE: 0.52

## 2025-07-15 NOTE — LETTER
[facility]  Fouzia Dawson M.D.  [address]  [place] [address]  Fax: [phone]   Authorization for Release/Disclosure of   Protected Health Information   Name: GIDEON ALEXANDRA : 1995 SSN: [id number]   Address: [facility] [address]  [place] [address] Phone:    There are no phone numbers on file.   I authorize the entity listed below to release/disclose the PHI below to:   [facility]/Fouzia Dawson M.D. and Fouzia Dawson M.D.   Provider or Entity Name:  [facility]   Address   [facility]  [address] [place] [address] Phone:  [phone]    Fax:     Reason for request: continuity of care   Information to be released:    [  ] LAST COLONOSCOPY,  including any PATH REPORT and follow-up  [  ] LAST FIT/COLOGUARD RESULT [  ] LAST DEXA  [  ] LAST MAMMOGRAM  [  ] LAST PAP  [  ] LAST LABS [  ] RETINA EXAM REPORT  [  ] IMMUNIZATION RECORDS  [xxxxx] Release all info      [  ] Check here and initial the line next to each item to release ALL health information INCLUDING  _____ Care and treatment for drug and / or alcohol abuse  _____ HIV testing, infection status, or AIDS  _____ Genetic Testing    DATES OF SERVICE OR TIME PERIOD TO BE DISCLOSED: _____________  I understand and acknowledge that:  * This Authorization may be revoked at any time by you in writing, except if your health information has already been used or disclosed.  * Your health information that will be used or disclosed as a result of you signing this authorization could be re-disclosed by the recipient. If this occurs, your re-disclosed health information may no longer be protected by State or Federal laws.  * You may refuse to sign this Authorization. Your refusal will not affect your ability to obtain treatment.  * This Authorization becomes effective upon signing and will  on (date) __________.      If no date is indicated, this Authorization will  one (1) year from the signature date.     Name: Mariia Burtonle Johann  Signature: Date:   7/15/2025     PLEASE FAX REQUESTED RECORDS BACK TO: [phone]

## 2025-07-15 NOTE — PROGRESS NOTES
Subjective:   Verbal consent was acquired by the patient to use [employer] ambient listening note generation during this visit Yes     CC:  The primary encounter diagnosis was Cyst of ovary, unspecified laterality. Diagnoses of Encounter to establish care, Renal cyst, Migraine without aura and without status migrainosus, not intractable, and Anxiety and depression were also pertinent to this visit.    History of Present Illness  Ms. Wills is a pleasant 28 yo who presents today to establish care.     She has a history of migraine headaches, which typically occur upon waking. The triggers are inconsistent, with alcohol sometimes inducing a migraine and other times not. The migraines are severe enough to cause her to lie in bed and cry. She experiences these migraines at least once a month, each lasting about 2 days. She has not tried sumatriptan before. She has previously tried medication for her migraines, including blood pressure medication, but found them unhelpful or unpleasant. Currently, she manages her migraines with Excedrin.    She was recently seen in the emergency department for abdominal pain, where an ovarian cyst and a renal cyst were discovered. Outpatient ultrasounds were recommended. In March 2024, she visited the ER due to severe cramps, which subsided upon arrival. An ultrasound was performed, but she does not recall any mention of a cyst. Her menstrual cycles are regular, starting on the same day or one day later, and are medium to heavy in flow. She had a Pap smear two years ago during a pregnancy termination to check for HPV. She is curious if she will start getting cysts. She continues to experience dull pain from the ruptured ovarian cyst.    She also reports feeling bloated and tender, similar to pregnancy, and is considering dietary changes. She is currently taking Colace for constipation.    She is currently on Lexapro for anxiety and depression and is seeking a psychiatrist. She was  "previously seeing a therapist who suggested psychiatric consultation due to frequent panic attacks. She has been taking Lexapro and feels significantly better. She was prediagnosed with ADHD by her therapist.    She experienced lightheadedness and has been taking an iron supplement daily.    She is no longer taking birth control as it disrupts her menstrual cycle and makes her feel unwell. She takes ibuprofen and Claritin as needed, but has discontinued fish oil and probiotics. She also takes Zofran as needed.    Social History:  Alcohol: She consumes alcohol occasionally.  Recreational Drugs: She smokes weed sometimes.    GYNECOLOGICAL HISTORY:  Frequency and Flow: Medium to heavy    FAMILY HISTORY  Her mother has a history of fibroids. Her maternal grandmother had colon cancer, and her paternal grandmother had breast cancer. Her paternal grandfather had heart disease, and her maternal grandfather had lung disease. One sister has migraine headaches.      Patient Active Problem List    Diagnosis Date Noted    Cyst of ovary 07/15/2025    Renal cyst 07/15/2025    Anxiety and depression 07/15/2025    Migraine without aura and without status migrainosus, not intractable 08/16/2017       Health Maintenance: Completed  -Records requested from [facility]  -Declined HIV and Hep C screening  -Declined Covid Booster    ROS:   Review of Systems   Constitutional:  Negative for chills and fever.   Respiratory:  Negative for shortness of breath.    Cardiovascular:  Negative for chest pain and palpitations.         Objective:     Exam: /60 (BP Location: Right arm, Patient Position: Sitting, BP Cuff Size: Adult)   Pulse 70   Temp 36.1 °C (97 °F) (Temporal)   Ht 1.702 m (5' 7\")   Wt 77.1 kg (170 lb)   SpO2 99%  Body mass index is 26.63 kg/m².    Physical Exam  Constitutional:       Appearance: Normal appearance.   Cardiovascular:      Rate and Rhythm: Normal rate and regular rhythm.      Heart sounds: Normal heart " sounds.   Pulmonary:      Effort: Pulmonary effort is normal. No respiratory distress.      Breath sounds: Normal breath sounds. No stridor. No wheezing, rhonchi or rales.   Neurological:      Mental Status: She is alert.             Assessment & Plan:   29 y.o. female with the following -    1. Encounter to establish care  Patient presents today to establish care. Chart was reviewed and history was discussed in detail with the patient/    2. Cyst of ovary, unspecified laterality  Patient was seen in the emergency department on 7/11/2025 for abdominal pain.  She had imaging completed which showed a probable collapsing right ovarian cyst with free intraperitoneal fluid in the pelvis.  She reports that she continues to have a dull pain that is less in intensity.  Patient had a transvaginal ultrasound completed in 3/2024 which did not reveal any ovarian cyst.  We will repeat transabdominal/transvaginal ultrasound.  - US-PELVIC COMPLETE (TRANSABDOMINAL/TRANSVAGINAL) (COMBO); Future    3. Renal cyst  Patient was seen in the emergency department on 7/11/2025 for abdominal pain.  CT abdomen revealed a complex cystic lesion in the lower pole of the right kidney.  Per ED note this finding was discussed with the radiologist who suspected this was a sequela of previous infections or simply a calcification of a previous simple cyst.  It was recommended that patient have outpatient ultrasound for follow-up.  I have gone ahead and ordered a renal ultrasound.  - US-RENAL; Future    4. Migraine without aura and without status migrainosus, not intractable  Chronic, intermittent.  Patient reports having migraine headache that will last for at least 2 days once a month.  She typically wakes up with the headaches and will take Excedrin.  At this time we will trial Imitrex 100 mg as needed for migraine headaches.  - sumatriptan (IMITREX) 100 MG tablet; Take 1 Tablet by mouth one time as needed for Migraine for up to 1 dose.  Dispense:  10 Tablet; Refill: 3    5. Anxiety and depression  Chronic, ongoing.  Patient is currently on Lexapro 10 mg daily.  She is requesting a referral for psychiatry.  - Referral to Behavioral Health      Return in about 3 weeks (around 8/5/2025), or if symptoms worsen or fail to improve, for Annual with pap.    Please note that this dictation was created using voice recognition software. I have made every reasonable attempt to correct obvious errors, but I expect that there are errors of grammar and possibly content that I did not discover before finalizing the note.

## 2025-07-21 NOTE — Clinical Note
REFERRAL APPROVAL NOTICE         Sent on July 21, 2025                   Mariia Wills  [address] [facility] [place] [address]                   Dear Ms. Wills,    After a careful review of the medical information and benefit coverage, [facility] has processed your referral. See below for additional details.    If applicable, you must be actively enrolled with your insurance for coverage of the authorized service. If you have any questions regarding your coverage, please contact your insurance directly.    REFERRAL INFORMATION   Referral #:  [id number]  Referred-To Department    Referred-By Provider:  Behavioral Health    Fouzia Dawson M.D.   Behavioral Health Outpatient      [address] [facility] [place] [address] [phone] [address] [facility] [address]  [facility] [id number] [phone]    Referral Start Date:  07/15/2025  Referral End Date:   07/15/2026             SCHEDULING  If you do not already have an appointment, please call [phone] to make an appointment.     MORE INFORMATION  If you do not already have a [facility] account, sign up at: [email or web address]  You can access your medical information, make appointments, see lab results, billing information, and more.  If you have questions regarding this referral, please contact  the [facility] Referrals department at:             [phone]. Monday - Friday 8:00AM - 5:00PM.     Sincerely,    [facility]

## 2025-08-13 ENCOUNTER — HOSPITAL ENCOUNTER (OUTPATIENT)
Dept: RADIOLOGY | Facility: MEDICAL CENTER | Age: 30
End: 2025-08-13
Attending: STUDENT IN AN ORGANIZED HEALTH CARE EDUCATION/TRAINING PROGRAM
Payer: COMMERCIAL

## 2025-08-13 ENCOUNTER — HOSPITAL ENCOUNTER (OUTPATIENT)
Facility: MEDICAL CENTER | Age: 30
End: 2025-08-13
Attending: STUDENT IN AN ORGANIZED HEALTH CARE EDUCATION/TRAINING PROGRAM
Payer: COMMERCIAL

## 2025-08-13 ENCOUNTER — OFFICE VISIT (OUTPATIENT)
Dept: MEDICAL GROUP | Facility: PHYSICIAN GROUP | Age: 30
End: 2025-08-13
Payer: COMMERCIAL

## 2025-08-13 VITALS
DIASTOLIC BLOOD PRESSURE: 64 MMHG | SYSTOLIC BLOOD PRESSURE: 108 MMHG | TEMPERATURE: 97.3 F | BODY MASS INDEX: 26.32 KG/M2 | OXYGEN SATURATION: 99 % | HEIGHT: 67 IN | HEART RATE: 64 BPM | WEIGHT: 167.7 LBS

## 2025-08-13 DIAGNOSIS — Z00.00 WELLNESS EXAMINATION: Primary | ICD-10-CM

## 2025-08-13 DIAGNOSIS — Z12.4 SCREENING FOR CERVICAL CANCER: ICD-10-CM

## 2025-08-13 DIAGNOSIS — N28.1 RENAL CYST: ICD-10-CM

## 2025-08-13 DIAGNOSIS — N83.209 CYST OF OVARY, UNSPECIFIED LATERALITY: ICD-10-CM

## 2025-08-13 PROCEDURE — 3078F DIAST BP <80 MM HG: CPT | Performed by: STUDENT IN AN ORGANIZED HEALTH CARE EDUCATION/TRAINING PROGRAM

## 2025-08-13 PROCEDURE — 3074F SYST BP LT 130 MM HG: CPT | Performed by: STUDENT IN AN ORGANIZED HEALTH CARE EDUCATION/TRAINING PROGRAM

## 2025-08-13 PROCEDURE — 76775 US EXAM ABDO BACK WALL LIM: CPT

## 2025-08-13 PROCEDURE — 76830 TRANSVAGINAL US NON-OB: CPT

## 2025-08-13 PROCEDURE — 99395 PREV VISIT EST AGE 18-39: CPT | Performed by: STUDENT IN AN ORGANIZED HEALTH CARE EDUCATION/TRAINING PROGRAM

## 2025-08-13 PROCEDURE — 88175 CYTOPATH C/V AUTO FLUID REDO: CPT

## 2025-08-13 ASSESSMENT — FIBROSIS 4 INDEX: FIB4 SCORE: 0.52

## 2025-08-14 DIAGNOSIS — Z12.4 SCREENING FOR CERVICAL CANCER: ICD-10-CM

## 2025-08-19 LAB — THINPREP PAP, CYTOLOGY NL11781: NORMAL

## 2025-08-29 ENCOUNTER — APPOINTMENT (OUTPATIENT)
Dept: BEHAVIORAL HEALTH | Facility: PSYCHIATRIC FACILITY | Age: 30
End: 2025-08-29
Payer: COMMERCIAL

## 2025-09-12 ENCOUNTER — APPOINTMENT (OUTPATIENT)
Dept: BEHAVIORAL HEALTH | Facility: PSYCHIATRIC FACILITY | Age: 30
End: 2025-09-12
Payer: COMMERCIAL